# Patient Record
Sex: FEMALE | Race: WHITE | Employment: FULL TIME | URBAN - METROPOLITAN AREA
[De-identification: names, ages, dates, MRNs, and addresses within clinical notes are randomized per-mention and may not be internally consistent; named-entity substitution may affect disease eponyms.]

---

## 2017-09-25 ENCOUNTER — OP HISTORICAL/CONVERTED ENCOUNTER (OUTPATIENT)
Dept: OTHER | Age: 57
End: 2017-09-25

## 2018-10-09 PROBLEM — F41.9 ANXIETY: Status: ACTIVE | Noted: 2018-10-09

## 2018-10-09 RX ORDER — CLORAZEPATE DIPOTASSIUM 3.75 MG/1
TABLET ORAL
COMMUNITY
End: 2018-10-10 | Stop reason: SDUPTHER

## 2018-10-10 ENCOUNTER — OFFICE VISIT (OUTPATIENT)
Dept: FAMILY MEDICINE CLINIC | Age: 58
End: 2018-10-10

## 2018-10-10 VITALS
WEIGHT: 145 LBS | BODY MASS INDEX: 26.68 KG/M2 | OXYGEN SATURATION: 98 % | HEART RATE: 96 BPM | DIASTOLIC BLOOD PRESSURE: 73 MMHG | SYSTOLIC BLOOD PRESSURE: 112 MMHG | TEMPERATURE: 98.5 F | HEIGHT: 62 IN

## 2018-10-10 DIAGNOSIS — F41.9 ANXIETY: Primary | ICD-10-CM

## 2018-10-10 RX ORDER — LEVONORGESTREL AND ETHINYL ESTRADIOL 6-5-10
KIT ORAL
Refills: 3 | COMMUNITY
Start: 2018-08-15 | End: 2020-09-16

## 2018-10-10 RX ORDER — LATANOPROST 50 UG/ML
SOLUTION/ DROPS OPHTHALMIC
Refills: 3 | COMMUNITY
Start: 2018-08-24

## 2018-10-10 RX ORDER — CLORAZEPATE DIPOTASSIUM 3.75 MG/1
3.75 TABLET ORAL 3 TIMES DAILY
Qty: 90 TAB | Refills: 2 | Status: SHIPPED | OUTPATIENT
Start: 2018-10-10 | End: 2019-01-07 | Stop reason: SDUPTHER

## 2018-10-10 NOTE — PROGRESS NOTES
[address] [facility] [address], [address] [facility], [address] [facility] [address]. Marcellus Gilliam. Wil João Phone:  [phone] Fax:  [phone] Progress Note Name:  Jf Kaur Age:  62 y.o. 
:  1960 Encounter Date:  10/10/2018 Primary Care Provider:  Amado Hernandez MD 
 
Chief Complaint Patient presents with  Medication Refill HPI: 
Patient here to follow-up for anxiety and determine medication refill and adjustments and appropriate lab evaluation. Patient is compliant with medications and no new side effects. Medication monitoring:  last checked:2/15/18,  shows appropriate use Last urine drug screen:  Last urine drug screen done 17, Controlled substance contract: yes pain contract signed:   
 
Past Medical History:  
Diagnosis Date  Anxiety Past Surgical History:  
Procedure Laterality Date  HX CHOLECYSTECTOMY Family History Problem Relation Age of Onset  Other Mother   
  brain tumor  Diabetes Maternal Aunt Social History Social History  Marital status: UNKNOWN Spouse name: N/A  
 Number of children: N/A  
 Years of education: N/A Social History Main Topics  Smoking status: Current Every Day Smoker Packs/day: 0.50 Types: Cigarettes  Smokeless tobacco: Never Used  Alcohol use Yes Comment: occassional  
 Drug use: No  
 Sexual activity: Yes Other Topics Concern  None Social History Narrative  None Current Outpatient Prescriptions Medication Sig Dispense Refill  latanoprost (XALATAN) 0.005 % ophthalmic solution INSTILL 1 DROP INTO BOTH EYES QHS  3  
 levonorgestrel-ethinyl estradiol (ENPRESSE) 50-30 (6)/75-40 (5)/125-30(10) tab TK 1 T PO D  3  
 clorazepate (TRANXENE) 3.75 mg tablet Take 1 Tab by mouth three (3) times daily. Max Daily Amount: 11.25 mg. 90 Tab 2  
 levonorgestrel-ethin estradiol (ENPRESSE PO) Take  by mouth. No Known Allergies Patient Active Problem List  
Diagnosis Code  Anxiety F41.9 Review of Systems Constitutional: Negative for fever. Respiratory: Negative for shortness of breath. Cardiovascular: Negative for chest pain, orthopnea and PND. Gastrointestinal: Negative for abdominal pain, nausea and vomiting. Visit Vitals  /73 (BP 1 Location: Right arm, BP Patient Position: Sitting)  Pulse 96  Temp 98.5 °F (36.9 °C) (Oral)  Ht 5' 2\" (1.575 m)  Wt 145 lb (65.8 kg)  LMP 10/03/2018  SpO2 98%  BMI 26.52 kg/m2 Physical Exam  
Constitutional: She is oriented to person, place, and time and well-developed, well-nourished, and in no distress. Cardiovascular: Normal rate, regular rhythm and normal heart sounds. Pulmonary/Chest: Effort normal and breath sounds normal. No respiratory distress. She has no wheezes. Neurological: She is alert and oriented to person, place, and time. Skin: Skin is warm and dry. Labs/Radiology Reviewed Assessment/Plan: ICD-10-CM ICD-9-CM 1. Anxiety F41.9 300.00 clorazepate (TRANXENE) 3.75 mg tablet Orders Placed This Encounter  clorazepate (TRANXENE) 3.75 mg tablet Follow-up Disposition: 
Return in about 4 weeks (around 11/7/2018) for annual exam with lab work. Juanita Steel MD 
10/10/2018

## 2018-10-10 NOTE — PROGRESS NOTES
David Torrez is a 62 y.o. female Chief Complaint Patient presents with  Medication Refill 1. Have you been to the ER, urgent care clinic since your last visit? Hospitalized since your last visit? [facility] 2. Have you seen or consulted any other health care providers outside of the [facility] since your last visit? Include any pap smears or colon screening.    no

## 2018-10-11 DIAGNOSIS — Z00.00 ANNUAL PHYSICAL EXAM: Primary | ICD-10-CM

## 2018-11-08 LAB
ALBUMIN SERPL-MCNC: 4.5 G/DL (ref 3.5–5.5)
ALBUMIN/CREAT UR: 27.3 MG/G CREAT (ref 0–30)
ALP SERPL-CCNC: 58 IU/L (ref 39–117)
ALT SERPL-CCNC: 22 IU/L (ref 0–32)
AST SERPL-CCNC: 19 IU/L (ref 0–40)
BASOPHILS # BLD AUTO: 0 X10E3/UL (ref 0–0.2)
BASOPHILS NFR BLD AUTO: 0 %
BILIRUB DIRECT SERPL-MCNC: 0.13 MG/DL (ref 0–0.4)
BILIRUB SERPL-MCNC: 0.4 MG/DL (ref 0–1.2)
BUN SERPL-MCNC: 11 MG/DL (ref 6–24)
BUN/CREAT SERPL: 14 (ref 9–23)
CALCIUM SERPL-MCNC: 9.1 MG/DL (ref 8.7–10.2)
CHLORIDE SERPL-SCNC: 101 MMOL/L (ref 96–106)
CHOLEST SERPL-MCNC: 148 MG/DL (ref 100–199)
CO2 SERPL-SCNC: 27 MMOL/L (ref 20–29)
CREAT SERPL-MCNC: 0.8 MG/DL (ref 0.57–1)
CREAT UR-MCNC: 82.1 MG/DL
EOSINOPHIL # BLD AUTO: 0.1 X10E3/UL (ref 0–0.4)
EOSINOPHIL NFR BLD AUTO: 1 %
ERYTHROCYTE [DISTWIDTH] IN BLOOD BY AUTOMATED COUNT: 13.1 % (ref 12.3–15.4)
EST. AVERAGE GLUCOSE BLD GHB EST-MCNC: 108 MG/DL
GLUCOSE SERPL-MCNC: 93 MG/DL (ref 65–99)
HBA1C MFR BLD: 5.4 % (ref 4.8–5.6)
HCT VFR BLD AUTO: 39.5 % (ref 34–46.6)
HCV AB S/CO SERPL IA: 0.2 S/CO RATIO (ref 0–0.9)
HDLC SERPL-MCNC: 57 MG/DL
HGB BLD-MCNC: 12.7 G/DL (ref 11.1–15.9)
HIV 1+2 AB+HIV1 P24 AG SERPL QL IA: NON REACTIVE
IMM GRANULOCYTES # BLD: 0 X10E3/UL (ref 0–0.1)
IMM GRANULOCYTES NFR BLD: 0 %
LDLC SERPL CALC-MCNC: 76 MG/DL (ref 0–99)
LYMPHOCYTES # BLD AUTO: 2 X10E3/UL (ref 0.7–3.1)
LYMPHOCYTES NFR BLD AUTO: 35 %
MCH RBC QN AUTO: 32.6 PG (ref 26.6–33)
MCHC RBC AUTO-ENTMCNC: 32.2 G/DL (ref 31.5–35.7)
MCV RBC AUTO: 102 FL (ref 79–97)
MICROALBUMIN UR-MCNC: 22.4 UG/ML
MONOCYTES # BLD AUTO: 0.3 X10E3/UL (ref 0.1–0.9)
MONOCYTES NFR BLD AUTO: 6 %
NEUTROPHILS # BLD AUTO: 3.3 X10E3/UL (ref 1.4–7)
NEUTROPHILS NFR BLD AUTO: 58 %
PLATELET # BLD AUTO: 264 X10E3/UL (ref 150–379)
POTASSIUM SERPL-SCNC: 4.6 MMOL/L (ref 3.5–5.2)
PROT SERPL-MCNC: 7.2 G/DL (ref 6–8.5)
RBC # BLD AUTO: 3.89 X10E6/UL (ref 3.77–5.28)
SODIUM SERPL-SCNC: 141 MMOL/L (ref 134–144)
TRIGL SERPL-MCNC: 76 MG/DL (ref 0–149)
TSH SERPL DL<=0.005 MIU/L-ACNC: 1.09 UIU/ML (ref 0.45–4.5)
VLDLC SERPL CALC-MCNC: 15 MG/DL (ref 5–40)
WBC # BLD AUTO: 5.7 X10E3/UL (ref 3.4–10.8)

## 2018-11-13 ENCOUNTER — OFFICE VISIT (OUTPATIENT)
Dept: FAMILY MEDICINE CLINIC | Age: 58
End: 2018-11-13

## 2018-11-13 VITALS
TEMPERATURE: 97.6 F | HEART RATE: 90 BPM | SYSTOLIC BLOOD PRESSURE: 125 MMHG | WEIGHT: 151 LBS | HEIGHT: 62 IN | DIASTOLIC BLOOD PRESSURE: 75 MMHG | BODY MASS INDEX: 27.79 KG/M2 | OXYGEN SATURATION: 98 % | RESPIRATION RATE: 18 BRPM

## 2018-11-13 DIAGNOSIS — Z23 ENCOUNTER FOR IMMUNIZATION: Primary | ICD-10-CM

## 2018-11-13 DIAGNOSIS — Z00.00 ANNUAL PHYSICAL EXAM: ICD-10-CM

## 2018-11-13 NOTE — PROGRESS NOTES
Sailaja Rodriguez is a 62 y.o. female who had concerns including Complete Physical.    Health maintenance:    Immunizations needed: None documented in past   Tdap or DT maybe   Shingrix needs   Influenza vaccine needs    Cancer screening status   Colonoscopy never willing to do cologard but not colonoscopy   PAP all normal   Mammogram 1/19/2017 Breast bx X 2 benign   Lung CT smoker but not indicated    Bone density screening 1/19/2017 osteopenia    Cardiovascular risk factors:   Smoking yes   HTN no   Cholesterol great   Diabetes nondiabetic   Chronic kidney disease   Family History    Last eye exam: getting  Last dental exam:not getting    Meds:    Current Outpatient Medications:     varicella-zoster recombinant, PF, (SHINGRIX) 50 mcg/0.5 mL susr injection, 0.5 mL by IntraMUSCular route once for 1 dose. Repeat second dose in 6 months., Disp: 0.5 mL, Rfl: 1    latanoprost (XALATAN) 0.005 % ophthalmic solution, INSTILL 1 DROP INTO BOTH EYES QHS, Disp: , Rfl: 3    levonorgestrel-ethinyl estradiol (ENPRESSE) 50-30 (6)/75-40 (5)/125-30(10) tab, TK 1 T PO D, Disp: , Rfl: 3    clorazepate (TRANXENE) 3.75 mg tablet, Take 1 Tab by mouth three (3) times daily.  Max Daily Amount: 11.25 mg., Disp: 90 Tab, Rfl: 2   Allergies:  No Known Allergies  Smoker:   Social History     Tobacco Use   Smoking Status Current Every Day Smoker    Packs/day: 0.35    Types: Cigarettes   Smokeless Tobacco Never Used     ETOH:   Social History     Substance and Sexual Activity   Alcohol Use No    Frequency: Never       FH:    Family History   Problem Relation Age of Onset    Other Mother         brain tumor    Diabetes Maternal Aunt        ROS:  General/Constitutional:  No headache, fever, fatigue, weight loss or weight gain     Eyes:  No redness, pruritis, pain, visual changes, swelling, or discharge    Ears:  No pain, loss or changes in hearin    Neck:  No swelling, masses, stiffness, pain, or limited movemen    Cardiac:   No chest pain Respiratory:  No cough or shortness of breath    GI:  No nausea/vomiting, diarrhea, abdominal pain, bloody or dark stools     :  No dysuria or  hematuria   Neurological:  No loss of consciousness, dizziness, seizures, dysarthria, cognitive changes, memory changes,  problems with balance, or unilateral weakness    Skin: No rash         Physical Exam:  Visit Vitals  /75 (BP 1 Location: Left arm, BP Patient Position: Sitting)   Pulse 90   Temp 97.6 °F (36.4 °C) (Oral)   Resp 18   Ht 5' 2\" (1.575 m)   Wt 151 lb (68.5 kg)   LMP 11/02/2018   SpO2 98%   BMI 27.62 kg/m²       Physical Examination:   General appearance - alert, well appearing, and in no distress, oriented to person, place, and time and normal appearing weight  Mental status -  normal mood, behavior, speech, dress, motor activity, and thought processes, no expressed suicidal or homicidal ideation, no hallucinations  Ears - bilateral TM's and external ear canals normal  Nose - normal and patent, no erythema, discharge or polyps  Mouth - mucous membranes moist, pharynx normal without lesions  Neck - supple, no significant adenopathy  Breast-sees GYN  Chest - normal chest excursion, normal inspiratory and expiratory function. Clear to ausculation bilaterally. Heart - normal rate, regular rhythm, normal S1, S2, no murmurs, rubs, clicks or gallops, no extremity edema  Abdomen- benign, no organomegaly or masses  GYN-sees GYN  Skin-no rashes or suspicious moles  Neuro normal speech, moves all extremities, normal gait  Musculoskeletal- grossly normal joint and motor function. d to person, place, and time and normal appearing weight      Assessment and Plan:    1. Encounter for immunization  Check on status of tet booster  - varicella-zoster recombinant, PF, (SHINGRIX) 50 mcg/0.5 mL susr injection; 0.5 mL by IntraMUSCular route once for 1 dose. Repeat second dose in 6 months. Dispense: 0.5 mL; Refill: 1    2.  Annual physical exam  Get yearly mammogram,  cologard ordered  Stop smoking. Yearly exam  Should talk to GYN about getting off of OCP's      Diagnoses and plans were discussed with the patient. After visit summary given to the patient as well.     Pauline Victor MD

## 2018-11-13 NOTE — PATIENT INSTRUCTIONS
Well Visit, Women 48 to 72: Care Instructions  Your Care Instructions    Physical exams can help you stay healthy. Your doctor has checked your overall health and may have suggested ways to take good care of yourself. He or she also may have recommended tests. At home, you can help prevent illness with healthy eating, regular exercise, and other steps. Follow-up care is a key part of your treatment and safety. Be sure to make and go to all appointments, and call your doctor if you are having problems. It's also a good idea to know your test results and keep a list of the medicines you take. How can you care for yourself at home? · Reach and stay at a healthy weight. This will lower your risk for many problems, such as obesity, diabetes, heart disease, and high blood pressure. · Get at least 30 minutes of exercise on most days of the week. Walking is a good choice. You also may want to do other activities, such as running, swimming, cycling, or playing tennis or team sports. · Do not smoke. Smoking can make health problems worse. If you need help quitting, talk to your doctor about stop-smoking programs and medicines. These can increase your chances of quitting for good. · Protect your skin from too much sun. When you're outdoors from 10 a.m. to 4 p.m., stay in the shade or cover up with clothing and a hat with a wide brim. Wear sunglasses that block UV rays. Even when it's cloudy, put broad-spectrum sunscreen (SPF 30 or higher) on any exposed skin. · See a dentist one or two times a year for checkups and to have your teeth cleaned. · Wear a seat belt in the car. · Limit alcohol to 1 drink a day. Too much alcohol can cause health problems. Follow your doctor's advice about when to have certain tests. These tests can spot problems early. · Cholesterol.  Your doctor will tell you how often to have this done based on your age, family history, or other things that can increase your risk for heart attack and stroke. · Blood pressure. Have your blood pressure checked during a routine doctor visit. Your doctor will tell you how often to check your blood pressure based on your age, your blood pressure results, and other factors. · Mammogram. Ask your doctor how often you should have a mammogram, which is an X-ray of your breasts. A mammogram can spot breast cancer before it can be felt and when it is easiest to treat. · Pap test and pelvic exam. Ask your doctor how often you should have a Pap test. You may not need to have a Pap test as often as you used to. · Vision. Have your eyes checked every year or two or as often as your doctor suggests. Some experts recommend that you have yearly exams for glaucoma and other age-related eye problems starting at age 48. · Hearing. Tell your doctor if you notice any change in your hearing. You can have tests to find out how well you hear. · Diabetes. Ask your doctor whether you should have tests for diabetes. · Colon cancer. You should begin tests for colon cancer at age 48. You may have one of several tests. Your doctor will tell you how often to have tests based on your age and risk. Risks include whether you already had a precancerous polyp removed from your colon or whether your parents, sisters and brothers, or children have had colon cancer. · Thyroid disease. Talk to your doctor about whether to have your thyroid checked as part of a regular physical exam. Women have an increased chance of a thyroid problem. · Osteoporosis. You should begin tests for bone density at age 72. If you are younger than 72, ask your doctor whether you have factors that may increase your risk for this disease. You may want to have this test before age 72. · Heart attack and stroke risk. At least every 4 to 6 years, you should have your risk for heart attack and stroke assessed.  Your doctor uses factors such as your age, blood pressure, cholesterol, and whether you smoke or have diabetes to show what your risk for a heart attack or stroke is over the next 10 years. When should you call for help? Watch closely for changes in your health, and be sure to contact your doctor if you have any problems or symptoms that concern you. Where can you learn more? Go to http://rayna-leno.info/. Enter K602 in the search box to learn more about \"Well Visit, Women 50 to 72: Care Instructions. \"  Current as of: March 29, 2018  Content Version: 11.8  © 8263-5050 [employer]. Care instructions adapted under license by [employer] (which disclaims liability or warranty for this information). If you have questions about a medical condition or this instruction, always ask your healthcare professional. [address] any warranty or liability for your use of this information.     GET MAMMOGRAM    STOP SMOKING    CHECK ON TETANUS BOOSTER STATUS    GET SHINGLES VACCINE    SEE DENTIST    DO COLOGARD

## 2019-01-07 ENCOUNTER — OFFICE VISIT (OUTPATIENT)
Dept: FAMILY MEDICINE CLINIC | Age: 59
End: 2019-01-07

## 2019-01-07 VITALS
DIASTOLIC BLOOD PRESSURE: 62 MMHG | TEMPERATURE: 98.5 F | SYSTOLIC BLOOD PRESSURE: 112 MMHG | HEART RATE: 85 BPM | HEIGHT: 63 IN | WEIGHT: 151 LBS | BODY MASS INDEX: 26.75 KG/M2 | RESPIRATION RATE: 16 BRPM | OXYGEN SATURATION: 99 %

## 2019-01-07 DIAGNOSIS — F41.9 ANXIETY: Primary | ICD-10-CM

## 2019-01-07 DIAGNOSIS — Z79.899 ENCOUNTER FOR DRUG THERAPY: ICD-10-CM

## 2019-01-07 RX ORDER — SULFAMETHOXAZOLE AND TRIMETHOPRIM 800; 160 MG/1; MG/1
TABLET ORAL
COMMUNITY
Start: 2018-10-23 | End: 2019-01-07 | Stop reason: ALTCHOICE

## 2019-01-07 RX ORDER — NITROFURANTOIN 25; 75 MG/1; MG/1
CAPSULE ORAL
Refills: 0 | COMMUNITY
Start: 2018-11-05 | End: 2019-01-07 | Stop reason: ALTCHOICE

## 2019-01-07 RX ORDER — CLORAZEPATE DIPOTASSIUM 3.75 MG/1
3.75 TABLET ORAL 3 TIMES DAILY
Qty: 90 TAB | Refills: 0 | Status: SHIPPED | OUTPATIENT
Start: 2019-03-06 | End: 2019-04-18 | Stop reason: SDUPTHER

## 2019-01-07 RX ORDER — CLORAZEPATE DIPOTASSIUM 3.75 MG/1
3.75 TABLET ORAL 3 TIMES DAILY
Qty: 90 TAB | Refills: 0 | Status: SHIPPED | OUTPATIENT
Start: 2019-02-06 | End: 2019-04-18 | Stop reason: SDUPTHER

## 2019-01-07 RX ORDER — CLORAZEPATE DIPOTASSIUM 3.75 MG/1
3.75 TABLET ORAL 3 TIMES DAILY
Qty: 90 TAB | Refills: 0 | Status: SHIPPED | OUTPATIENT
Start: 2019-01-07 | End: 2019-04-18 | Stop reason: SDUPTHER

## 2019-01-07 RX ORDER — PHENAZOPYRIDINE HYDROCHLORIDE 200 MG/1
TABLET, FILM COATED ORAL
COMMUNITY
Start: 2018-10-23 | End: 2019-01-07 | Stop reason: ALTCHOICE

## 2019-01-07 RX ORDER — CLORAZEPATE DIPOTASSIUM 3.75 MG/1
3.75 TABLET ORAL 3 TIMES DAILY
Qty: 90 TAB | Refills: 2 | Status: SHIPPED | OUTPATIENT
Start: 2019-01-07 | End: 2019-01-07 | Stop reason: CLARIF

## 2019-01-07 NOTE — PROGRESS NOTES
Maliha Gramajo is a 62 y.o. female who had concerns including Medication Refill (clorazepate refill). Patient presents today for Anxiety. Patient has had anxiety for 6-7 years. She currently takes Clorazepate 3.75mg TID. Patient taking medication as prescribed and without complaints. She has been on medication for 6-7 years. Last UDS was 11/21/17. She was previously on 7.5mg BID and has slowly decreased to 3.75mg from QID to TID. Work is very stressful and causes her to anxiety to worsen. ROS: (positive in bold)  General: wt loss, fever, chills, fatigue   Cardiac: chest pain   Pul: SOB  Psych: anxiety, depression    Past Medical History:  Past Medical History:   Diagnosis Date    Anxiety        Past Surgical History:  Past Surgical History:   Procedure Laterality Date    HX BREAST BIOPSY      X2 normal    HX CHOLECYSTECTOMY         Family History:  Family History   Problem Relation Age of Onset    Other Mother         brain tumor    Diabetes Maternal Aunt        Allergies:  No Known Allergies    Social History:  Social History     Tobacco Use    Smoking status: Current Every Day Smoker     Packs/day: 0.35     Types: Cigarettes    Smokeless tobacco: Never Used   Substance Use Topics    Alcohol use: No     Frequency: Never    Drug use: No       Current Meds:  Current Outpatient Medications on File Prior to Visit   Medication Sig Dispense Refill    latanoprost (XALATAN) 0.005 % ophthalmic solution INSTILL 1 DROP INTO BOTH EYES QHS  3    levonorgestrel-ethinyl estradiol (ENPRESSE) 50-30 (6)/75-40 (5)/125-30(10) tab TK 1 T PO D  3     No current facility-administered medications on file prior to visit.          Visit Vitals  /62 (BP 1 Location: Right arm, BP Patient Position: Sitting)   Pulse 85   Temp 98.5 °F (36.9 °C) (Oral)   Resp 16   Ht 5' 2.5\" (1.588 m)   Wt 151 lb (68.5 kg)   LMP 12/25/2018 (Within Days) Comment: regular   SpO2 99%   BMI 27.18 kg/m²       Gen:  Well developed, well nourished female in no acute distress  HEENT: normocephalic/atraumatic; EOMI  Card:  RRR, no m/r/g  Chest:  CTAB, no w/r/r  Abd:  BS+, Soft, nontender/nondistended  Psych:  Nl mood and affect     Assessment/Plan:      ICD-10-CM ICD-9-CM    1. Anxiety F41.9 300.00 10-PANEL URINE DRUG SCREEN      clorazepate (TRANXENE) 3.75 mg tablet      clorazepate (TRANXENE) 3.75 mg tablet      clorazepate (TRANXENE) 3.75 mg tablet      DISCONTINUED: clorazepate (TRANXENE) 3.75 mg tablet   2. Encounter for drug therapy Z79.899 V58.69 10-PANEL URINE DRUG SCREEN      History of Anxiety. Currently on Clorazepate 3.75mg. Dose has been slowly tapered to current dose.  was reviewed and appropriate w/o red flags. Taking medication as prescribed. Had a discussion about further tapering off of mediation. However, patient feels like her anxiety would be significantly worse if that were the case. Will consider tapering to BID at next visit. Will get UDS today. I have discussed the diagnosis with the patient and the intended plan as seen in the above orders. The patient has received an after-visit summary and questions were answered concerning future plans. I have discussed medication side effects and warnings with the patient as well. The patient agrees and understands above plan. Follow-up Disposition:  Return in about 3 months (around 4/7/2019) for Anxiety. Patient discussed with supervising attending.     Kami Mcpherson DO

## 2019-01-07 NOTE — PROGRESS NOTES
Identified pt with two pt identifiers(name and ). Chief Complaint   Patient presents with    Medication Refill     clorazepate refill        Health Maintenance Due   Topic    Pneumococcal 19-64 Medium Risk (1 of 1 - PPSV23)    DTaP/Tdap/Td series (1 - Tdap)    PAP AKA CERVICAL CYTOLOGY     Shingrix Vaccine Age 50> (1 of 2)    FOBT Q 1 YEAR AGE 54-65     Influenza Age 5 to Adult     BREAST CANCER SCRN MAMMOGRAM        Wt Readings from Last 3 Encounters:   19 151 lb (68.5 kg)   18 151 lb (68.5 kg)   10/10/18 145 lb (65.8 kg)     Temp Readings from Last 3 Encounters:   18 97.6 °F (36.4 °C) (Oral)   10/10/18 98.5 °F (36.9 °C) (Oral)     BP Readings from Last 3 Encounters:   18 125/75   10/10/18 112/73     Pulse Readings from Last 3 Encounters:   18 90   10/10/18 96         Learning Assessment:  :     Learning Assessment 10/10/2018   PRIMARY LEARNER Patient   HIGHEST LEVEL OF EDUCATION - PRIMARY LEARNER  GRADUATED HIGH SCHOOL OR GED   PRIMARY LANGUAGE ENGLISH   LEARNER PREFERENCE PRIMARY LISTENING   ANSWERED BY self   RELATIONSHIP SELF       Depression Screening:  :     PHQ over the last two weeks 10/10/2018   Little interest or pleasure in doing things Not at all   Feeling down, depressed, irritable, or hopeless Not at all   Total Score PHQ 2 0       Fall Risk Assessment:  :     No flowsheet data found. Abuse Screening:  :     No flowsheet data found. Coordination of Care Questionnaire:  :     1) Have you been to an emergency room, urgent care clinic since your last visit? no   Hospitalized since your last visit? no             2) Have you seen or consulted any other health care providers outside of [address] [place] [address] since your last visit? yes  Eye doctor - DR Shanda Puentes at [facility],  (Include any pap smears or colon screenings in this section.)    3) Do you have an Advance Directive on file?  no  Are you interested in receiving information about Advance Directives? no    Patient is accompanied by self I have received verbal consent from Mita Gillespie to discuss any/all medical information while they are present in the room. Reviewed record in preparation for visit and have obtained necessary documentation. Medication reconciliation up to date and corrected with patient at this time.

## 2019-01-11 LAB
AMPHETAMINES UR QL SCN: NEGATIVE NG/ML
BARBITURATES UR QL SCN: NEGATIVE NG/ML
BENZODIAZ UR QL: POSITIVE
BZE UR QL: NEGATIVE NG/ML
CANNABINOIDS UR QL SCN: NEGATIVE NG/ML
MDMA UR QL SCN: NEGATIVE NG/ML
METHADONE UR QL SCN: NEGATIVE NG/ML
METHAQUALONE UR QL: NEGATIVE NG/ML
OPIATES UR QL: NEGATIVE NG/ML
PCP UR QL: NEGATIVE NG/ML
PROPOXYPH UR QL SCN: NEGATIVE NG/ML

## 2019-01-18 ENCOUNTER — TELEPHONE (OUTPATIENT)
Dept: FAMILY MEDICINE CLINIC | Age: 59
End: 2019-01-18

## 2019-01-18 NOTE — TELEPHONE ENCOUNTER
Received letter from [facility] stating that patient has not sent their test in. Gave patient a call reminding patient to send in kit. Patient verbalized that she did have the test and will get it done in the next week.

## 2019-04-18 ENCOUNTER — OFFICE VISIT (OUTPATIENT)
Dept: FAMILY MEDICINE CLINIC | Age: 59
End: 2019-04-18

## 2019-04-18 VITALS
OXYGEN SATURATION: 98 % | TEMPERATURE: 98.3 F | DIASTOLIC BLOOD PRESSURE: 57 MMHG | SYSTOLIC BLOOD PRESSURE: 118 MMHG | RESPIRATION RATE: 16 BRPM | HEIGHT: 63 IN | HEART RATE: 70 BPM | WEIGHT: 153 LBS | BODY MASS INDEX: 27.11 KG/M2

## 2019-04-18 DIAGNOSIS — F41.9 ANXIETY: ICD-10-CM

## 2019-04-18 RX ORDER — CLORAZEPATE DIPOTASSIUM 3.75 MG/1
3.75 TABLET ORAL 3 TIMES DAILY
Qty: 90 TAB | Refills: 2 | Status: SHIPPED | OUTPATIENT
Start: 2019-04-18 | End: 2019-07-16 | Stop reason: SDUPTHER

## 2019-04-18 NOTE — PROGRESS NOTES
Assessment and Plan 1. Anxiety Chronic with chronic stressors Encouraged to taper meds if possible  checked and is OK 
FU 3 months 
- clorazepate (TRANXENE) 3.75 mg tablet; Take 1 Tab by mouth three (3) times daily. Max Daily Amount: 11.25 mg. Dispense: 90 Tab; Refill: 2 Follow-up and Dispositions · Return in about 3 months (around 7/18/2019) for Medication follow up. Diagnosis and plan discussed with patient who verbillized understanding. History of present Cruz Kehr is a 62 y.o. female presenting for Medication Refill (Clorazepate) Chronic anxiety On TID benzo Work and home stress listening to relationship of son and  No new side effects or issues with meds  is OK Review of Systems Psychiatric/Behavioral: Negative for depression, hallucinations, memory loss, substance abuse and suicidal ideas. The patient is nervous/anxious. The patient does not have insomnia. Past Medical History:  
Diagnosis Date  Anxiety Past Surgical History:  
Procedure Laterality Date  HX BREAST BIOPSY X2 normal  
 HX CHOLECYSTECTOMY Family History Problem Relation Age of Onset  Other Mother   
     brain tumor  Diabetes Maternal Aunt Social History Socioeconomic History  Marital status: UNKNOWN Spouse name: Not on file  Number of children: Not on file  Years of education: Not on file  Highest education level: Not on file Occupational History  Not on file Social Needs  Financial resource strain: Not on file  Food insecurity:  
  Worry: Not on file Inability: Not on file  Transportation needs:  
  Medical: Not on file Non-medical: Not on file Tobacco Use  Smoking status: Current Every Day Smoker Packs/day: 0.35 Types: Cigarettes  Smokeless tobacco: Never Used Substance and Sexual Activity  Alcohol use: No  
  Frequency: Never  Drug use: No  
 Sexual activity: Yes  
 Lifestyle  Physical activity:  
  Days per week: Not on file Minutes per session: Not on file  Stress: Not on file Relationships  Social connections:  
  Talks on phone: Not on file Gets together: Not on file Attends Judaism service: Not on file Active member of club or organization: Not on file Attends meetings of clubs or organizations: Not on file Relationship status: Not on file  Intimate partner violence:  
  Fear of current or ex partner: Not on file Emotionally abused: Not on file Physically abused: Not on file Forced sexual activity: Not on file Other Topics Concern  Not on file Social History Narrative  Not on file Current Outpatient Medications Medication Sig Dispense Refill  clorazepate (TRANXENE) 3.75 mg tablet Take 1 Tab by mouth three (3) times daily. Max Daily Amount: 11.25 mg. 90 Tab 2  
 latanoprost (XALATAN) 0.005 % ophthalmic solution INSTILL 1 DROP INTO BOTH EYES QHS  3  
 levonorgestrel-ethinyl estradiol (ENPRESSE) 50-30 (6)/75-40 (5)/125-30(10) tab TK 1 T PO D  3 No Known Allergies Vitals:  
 04/18/19 6932 BP: 118/57 Pulse: 70 Resp: 16 Temp: 98.3 °F (36.8 °C) SpO2: 98% Weight: 153 lb (69.4 kg) Height: 5' 2.5\" (1.588 m) Body mass index is 27.54 kg/m². Objective GENERAL: well developed; well nourished; well groomed; no apparent distress PSYCHIATRIC: Orientation: alert and oriented x 3; ; Mood/Affect: normal mood and affect;  Speech Pattern: normal;  Thought Processes: normal rate and content of thoughts; good abstract reasoning;  Associations: intact thought associations; Abnormal Thoughts: no hallucinations, delusions, obsessions, or suicidal/homicidal ideation;  Insight/Judgment: good insight; good judgment;

## 2019-07-16 ENCOUNTER — OFFICE VISIT (OUTPATIENT)
Dept: FAMILY MEDICINE CLINIC | Age: 59
End: 2019-07-16

## 2019-07-16 VITALS
HEIGHT: 63 IN | DIASTOLIC BLOOD PRESSURE: 70 MMHG | OXYGEN SATURATION: 96 % | RESPIRATION RATE: 16 BRPM | BODY MASS INDEX: 26.93 KG/M2 | HEART RATE: 87 BPM | SYSTOLIC BLOOD PRESSURE: 114 MMHG | TEMPERATURE: 98.6 F | WEIGHT: 152 LBS

## 2019-07-16 DIAGNOSIS — F41.9 ANXIETY: ICD-10-CM

## 2019-07-16 RX ORDER — CLORAZEPATE DIPOTASSIUM 3.75 MG/1
3.75 TABLET ORAL 3 TIMES DAILY
Qty: 90 TAB | Refills: 2 | Status: SHIPPED | OUTPATIENT
Start: 2019-07-16 | End: 2019-10-14 | Stop reason: SDUPTHER

## 2019-07-16 NOTE — PROGRESS NOTES
Assessment and Plan    1. Anxiety  Stable on current benzo. No evidence of misuse or abuse. - clorazepate (TRANXENE) 3.75 mg tablet; Take 1 Tab by mouth three (3) times daily. Max Daily Amount: 11.25 mg. Dispense: 90 Tab; Refill: 2      Follow-up and Dispositions    · Return in about 3 months (around 10/16/2019) for Medication follow up. Diagnosis and plan discussed with patient who verbillized understanding. History of present Guille Mccann is a 62 y.o. female presenting for Medication Refill    Here for refill of tranxene  Still ongoing stressors at home  Son getting ready to go to [employer] at [employer]. Issues at home over money. Review of Systems   Respiratory: Negative. Cardiovascular: Negative. Psychiatric/Behavioral: Negative for depression and suicidal ideas. The patient is nervous/anxious.           Past Medical History:   Diagnosis Date    Anxiety      Past Surgical History:   Procedure Laterality Date    HX BREAST BIOPSY      X2 normal    HX CHOLECYSTECTOMY       Family History   Problem Relation Age of Onset    Other Mother         brain tumor    Diabetes Maternal Aunt      Social History     Socioeconomic History    Marital status: UNKNOWN     Spouse name: Not on file    Number of children: Not on file    Years of education: Not on file    Highest education level: Not on file   Occupational History    Not on file   Social Needs    Financial resource strain: Not on file    Food insecurity:     Worry: Not on file     Inability: Not on file    Transportation needs:     Medical: Not on file     Non-medical: Not on file   Tobacco Use    Smoking status: Current Every Day Smoker     Packs/day: 0.35     Types: Cigarettes    Smokeless tobacco: Never Used   Substance and Sexual Activity    Alcohol use: No     Frequency: Never    Drug use: No    Sexual activity: Yes   Lifestyle    Physical activity:     Days per week: Not on file     Minutes per session: Not on file    Stress: Not on file   Relationships    Social connections:     Talks on phone: Not on file     Gets together: Not on file     Attends Buddhist service: Not on file     Active member of club or organization: Not on file     Attends meetings of clubs or organizations: Not on file     Relationship status: Not on file    Intimate partner violence:     Fear of current or ex partner: Not on file     Emotionally abused: Not on file     Physically abused: Not on file     Forced sexual activity: Not on file   Other Topics Concern    Not on file   Social History Narrative    Not on file         Current Outpatient Medications   Medication Sig Dispense Refill    clorazepate (TRANXENE) 3.75 mg tablet Take 1 Tab by mouth three (3) times daily. Max Daily Amount: 11.25 mg. 90 Tab 2    latanoprost (XALATAN) 0.005 % ophthalmic solution INSTILL 1 DROP INTO BOTH EYES QHS  3    levonorgestrel-ethinyl estradiol (ENPRESSE) 50-30 (6)/75-40 (5)/125-30(10) tab TK 1 T PO D  3         No Known Allergies    Vitals:    07/16/19 1545   BP: 114/70   Pulse: 87   Resp: 16   Temp: 98.6 °F (37 °C)   TempSrc: Oral   SpO2: 96%   Weight: 152 lb (68.9 kg)   Height: 5' 2.5\" (1.588 m)     Body mass index is 27.36 kg/m². Objective    GENERAL: well developed; well nourished; well groomed; no apparent distress  PSYCHIATRIC: Orientation: alert and oriented x 3; ; Mood/Affect: normal mood and affect;  Speech Pattern: normal;  Thought Processes: normal rate and content of thoughts; good abstract reasoning;  Associations: intact thought associations; Abnormal Thoughts: no hallucinations, delusions, obsessions, or suicidal/homicidal ideation;  Insight/Judgment: good insight; good judgment;

## 2019-09-23 PROBLEM — Z00.00 ANNUAL PHYSICAL EXAM: Status: RESOLVED | Noted: 2018-11-13 | Resolved: 2019-09-23

## 2019-10-14 ENCOUNTER — OFFICE VISIT (OUTPATIENT)
Dept: FAMILY MEDICINE CLINIC | Age: 59
End: 2019-10-14

## 2019-10-14 VITALS
DIASTOLIC BLOOD PRESSURE: 72 MMHG | HEART RATE: 82 BPM | WEIGHT: 150 LBS | TEMPERATURE: 98.2 F | SYSTOLIC BLOOD PRESSURE: 118 MMHG | RESPIRATION RATE: 18 BRPM | HEIGHT: 63 IN | BODY MASS INDEX: 26.58 KG/M2 | OXYGEN SATURATION: 97 %

## 2019-10-14 DIAGNOSIS — F41.9 ANXIETY: ICD-10-CM

## 2019-10-14 DIAGNOSIS — R30.0 BURNING WITH URINATION: Primary | ICD-10-CM

## 2019-10-14 LAB
BILIRUB UR QL STRIP: NEGATIVE
GLUCOSE UR-MCNC: NEGATIVE MG/DL
KETONES P FAST UR STRIP-MCNC: NEGATIVE MG/DL
PH UR STRIP: 7.5 [PH] (ref 4.6–8)
PROT UR QL STRIP: ABNORMAL
SP GR UR STRIP: 1.02 (ref 1–1.03)
UA UROBILINOGEN AMB POC: ABNORMAL (ref 0.2–1)
URINALYSIS CLARITY POC: ABNORMAL
URINALYSIS COLOR POC: YELLOW
URINE BLOOD POC: ABNORMAL
URINE LEUKOCYTES POC: ABNORMAL
URINE NITRITES POC: POSITIVE

## 2019-10-14 RX ORDER — SULFAMETHOXAZOLE AND TRIMETHOPRIM 800; 160 MG/1; MG/1
1 TABLET ORAL 2 TIMES DAILY
Qty: 20 TAB | Refills: 0 | Status: SHIPPED | OUTPATIENT
Start: 2019-10-14 | End: 2019-10-14 | Stop reason: SDUPTHER

## 2019-10-14 RX ORDER — SULFAMETHOXAZOLE AND TRIMETHOPRIM 800; 160 MG/1; MG/1
1 TABLET ORAL 2 TIMES DAILY
Qty: 20 TAB | Refills: 0 | Status: SHIPPED | OUTPATIENT
Start: 2019-10-14 | End: 2019-10-24

## 2019-10-14 RX ORDER — CLORAZEPATE DIPOTASSIUM 3.75 MG/1
3.75 TABLET ORAL 3 TIMES DAILY
Qty: 90 TAB | Refills: 2 | Status: SHIPPED | OUTPATIENT
Start: 2019-10-14 | End: 2020-01-08 | Stop reason: SDUPTHER

## 2019-10-14 NOTE — PROGRESS NOTES
Assessment and Plan    1. Burning with urination  UTI  - AMB POC URINALYSIS DIP STICK AUTO W/O MICRO  - URINALYSIS W/ RFLX MICROSCOPIC  - CULTURE, URINE  - trimethoprim-sulfamethoxazole (BACTRIM DS, SEPTRA DS) 160-800 mg per tablet; Take 1 Tab by mouth two (2) times a day for 10 days. Dispense: 20 Tab; Refill: 0    2. Anxiety  On chronic benzo for anxiety and remains stable  - clorazepate (TRANXENE) 3.75 mg tablet; Take 1 Tab by mouth three (3) times daily. Max Daily Amount: 11.25 mg. Dispense: 90 Tab; Refill: 2      Follow-up and Dispositions    · Return in about 3 months (around 1/14/2020) for Medication follow up. Diagnosis and plan discussed with patient who verbillized understanding. History of present Kirit Harman is a 62 y.o. female presenting for Medication Refill and Urinary Burning    UTI  Dysuria  Hurts at night  Subsides during the day  No fever or chills  No antibx allergies  Last infection in 2018    Anxiety  Remains on chronic Clorazepate  Some stressors that are worse at work      Review of Systems   Constitutional: Negative for chills and fever. Genitourinary: Negative for flank pain. Psychiatric/Behavioral: The patient is nervous/anxious.           Past Medical History:   Diagnosis Date    Anxiety      Past Surgical History:   Procedure Laterality Date    HX BREAST BIOPSY      X2 normal    HX CHOLECYSTECTOMY       Family History   Problem Relation Age of Onset    Other Mother         brain tumor    Diabetes Maternal Aunt      Social History     Socioeconomic History    Marital status:      Spouse name: Not on file    Number of children: Not on file    Years of education: Not on file    Highest education level: Not on file   Occupational History    Not on file   Social Needs    Financial resource strain: Not on file    Food insecurity:     Worry: Not on file     Inability: Not on file    Transportation needs:     Medical: Not on file     Non-medical: Not on file   Tobacco Use    Smoking status: Current Every Day Smoker     Packs/day: 0.35     Types: Cigarettes    Smokeless tobacco: Never Used   Substance and Sexual Activity    Alcohol use: No     Frequency: Never    Drug use: No    Sexual activity: Yes   Lifestyle    Physical activity:     Days per week: Not on file     Minutes per session: Not on file    Stress: Not on file   Relationships    Social connections:     Talks on phone: Not on file     Gets together: Not on file     Attends Yarsanism service: Not on file     Active member of club or organization: Not on file     Attends meetings of clubs or organizations: Not on file     Relationship status: Not on file    Intimate partner violence:     Fear of current or ex partner: Not on file     Emotionally abused: Not on file     Physically abused: Not on file     Forced sexual activity: Not on file   Other Topics Concern    Not on file   Social History Narrative    Not on file         Current Outpatient Medications   Medication Sig Dispense Refill    clorazepate (TRANXENE) 3.75 mg tablet Take 1 Tab by mouth three (3) times daily. Max Daily Amount: 11.25 mg. 90 Tab 2    trimethoprim-sulfamethoxazole (BACTRIM DS, SEPTRA DS) 160-800 mg per tablet Take 1 Tab by mouth two (2) times a day for 10 days. 20 Tab 0    latanoprost (XALATAN) 0.005 % ophthalmic solution INSTILL 1 DROP INTO BOTH EYES QHS  3    levonorgestrel-ethinyl estradiol (ENPRESSE) 50-30 (6)/75-40 (5)/125-30(10) tab TK 1 T PO D  3         No Known Allergies    Vitals:    10/14/19 1537   BP: 118/72   Pulse: 82   Resp: 18   Temp: 98.2 °F (36.8 °C)   TempSrc: Oral   SpO2: 97%   Weight: 150 lb (68 kg)   Height: 5' 2.5\" (1.588 m)     Body mass index is 27 kg/m². Objective  Physical Exam  General: Patient alert and oriented and in NAD  Neck: No thyromegaly or cervical lymphadenopathy  Cardiovascular: Heart has regular rate and rhythm, No murmurs, rubs or gallops.  No edema  Respiratory: Lungs are clear to auscultation bilaterally, no wheezing, rales or rhonchi, normal chest excursion and no increased work of breathing. Gastorintestinal: abdomen is non-tender, non-distended, without organomegaly or masses  Neuro: AAOx3, normal gait and speech. No gross neurologic deficits. Psych: Appropriate mood and affect, no homicidal or suicidal ideation, no obsessions, delusions or hallucinations, normal psychomotor status.

## 2019-10-15 LAB
APPEARANCE UR: ABNORMAL
BACTERIA #/AREA URNS HPF: ABNORMAL /[HPF]
BILIRUB UR QL STRIP: NEGATIVE
CASTS URNS QL MICRO: ABNORMAL /LPF
COLOR UR: YELLOW
EPI CELLS #/AREA URNS HPF: ABNORMAL /HPF (ref 0–10)
GLUCOSE UR QL: NEGATIVE
HGB UR QL STRIP: ABNORMAL
KETONES UR QL STRIP: NEGATIVE
LEUKOCYTE ESTERASE UR QL STRIP: ABNORMAL
MICRO URNS: ABNORMAL
MUCOUS THREADS URNS QL MICRO: PRESENT
NITRITE UR QL STRIP: POSITIVE
PH UR STRIP: 7.5 [PH] (ref 5–7.5)
PROT UR QL STRIP: ABNORMAL
RBC #/AREA URNS HPF: ABNORMAL /HPF (ref 0–2)
SP GR UR: 1.02 (ref 1–1.03)
UROBILINOGEN UR STRIP-MCNC: 1 MG/DL (ref 0.2–1)
WBC #/AREA URNS HPF: ABNORMAL /HPF (ref 0–5)

## 2019-10-16 LAB — BACTERIA UR CULT: ABNORMAL

## 2019-10-16 NOTE — PROGRESS NOTES
Call her. Urine culture shows she has a UTI and the antibiotic we gave her is the correct one and should work. See us to recheck urine in 2 weeks.   [facility]

## 2019-10-17 ENCOUNTER — TELEPHONE (OUTPATIENT)
Dept: FAMILY MEDICINE CLINIC | Age: 59
End: 2019-10-17

## 2019-10-17 NOTE — TELEPHONE ENCOUNTER
----- Message from Tanesha Sellers MD sent at 10/16/2019  5:25 PM EDT -----  Call her. Urine culture shows she has a UTI and the antibiotic we gave her is the correct one and should work. See us to recheck urine in 2 weeks.   [facility]

## 2019-10-28 ENCOUNTER — OFFICE VISIT (OUTPATIENT)
Dept: FAMILY MEDICINE CLINIC | Age: 59
End: 2019-10-28

## 2019-10-28 VITALS
TEMPERATURE: 98.5 F | SYSTOLIC BLOOD PRESSURE: 110 MMHG | BODY MASS INDEX: 26.93 KG/M2 | OXYGEN SATURATION: 100 % | HEART RATE: 87 BPM | HEIGHT: 63 IN | DIASTOLIC BLOOD PRESSURE: 72 MMHG | WEIGHT: 152 LBS | RESPIRATION RATE: 16 BRPM

## 2019-10-28 DIAGNOSIS — R30.0 BURNING WITH URINATION: Primary | ICD-10-CM

## 2019-10-28 LAB
BILIRUB UR QL STRIP: NEGATIVE
GLUCOSE UR-MCNC: NEGATIVE MG/DL
KETONES P FAST UR STRIP-MCNC: NEGATIVE MG/DL
PH UR STRIP: 7 [PH] (ref 4.6–8)
PROT UR QL STRIP: ABNORMAL
SP GR UR STRIP: 1.02 (ref 1–1.03)
UA UROBILINOGEN AMB POC: ABNORMAL (ref 0.2–1)
URINALYSIS CLARITY POC: CLEAR
URINALYSIS COLOR POC: YELLOW
URINE BLOOD POC: ABNORMAL
URINE LEUKOCYTES POC: ABNORMAL
URINE NITRITES POC: NEGATIVE

## 2019-10-28 NOTE — PROGRESS NOTES
Stephany Pabon is a 62 y.o. female      Chief Complaint   Patient presents with    UTI     This is a follow-up visit          1. Have you been to the ER, urgent care clinic since your last visit? Hospitalized since your last visit? No      2. Have you seen or consulted any other health care providers outside of the [address] [place] [address] since your last visit? Include any pap smears or colon screening.   No

## 2019-10-28 NOTE — PROGRESS NOTES
Assessment and Plan    1. Burning with urination  Recheck to see if urine has normalized  If still having hematuria, will send to UROLOGY  - AMB POC URINE DIP STICK MANUAL W/O MICRO CHEMSTRIP-10      Follow-up and Dispositions    · Return in about 3 months (around 1/28/2020) for Medication follow up. Diagnosis and plan discussed with patient who verbillized understanding. History of present Bhavesh Orta is a 62 y.o. female presenting for UTI (This is a follow-up visit )    FU of UTI  Was on  antibx for 10 days  Burning was better immediately  Pressure took longer to resolve, nearly til end of antibx. Review of Systems   Constitutional: Negative for chills and fever. Gastrointestinal: Negative. Genitourinary: Negative.           Past Medical History:   Diagnosis Date    Anxiety      Past Surgical History:   Procedure Laterality Date    HX BREAST BIOPSY      X2 normal    HX CHOLECYSTECTOMY       Family History   Problem Relation Age of Onset    Other Mother         brain tumor    Diabetes Maternal Aunt      Social History     Socioeconomic History    Marital status:      Spouse name: Not on file    Number of children: Not on file    Years of education: Not on file    Highest education level: Not on file   Occupational History    Not on file   Social Needs    Financial resource strain: Not on file    Food insecurity:     Worry: Not on file     Inability: Not on file    Transportation needs:     Medical: Not on file     Non-medical: Not on file   Tobacco Use    Smoking status: Current Every Day Smoker     Packs/day: 0.35     Types: Cigarettes    Smokeless tobacco: Never Used   Substance and Sexual Activity    Alcohol use: No     Frequency: Never    Drug use: No    Sexual activity: Yes   Lifestyle    Physical activity:     Days per week: Not on file     Minutes per session: Not on file    Stress: Not on file   Relationships    Social connections:     Talks on phone: Not on file     Gets together: Not on file     Attends Judaism service: Not on file     Active member of club or organization: Not on file     Attends meetings of clubs or organizations: Not on file     Relationship status: Not on file    Intimate partner violence:     Fear of current or ex partner: Not on file     Emotionally abused: Not on file     Physically abused: Not on file     Forced sexual activity: Not on file   Other Topics Concern    Not on file   Social History Narrative    Not on file         Current Outpatient Medications   Medication Sig Dispense Refill    clorazepate (TRANXENE) 3.75 mg tablet Take 1 Tab by mouth three (3) times daily. Max Daily Amount: 11.25 mg. 90 Tab 2    latanoprost (XALATAN) 0.005 % ophthalmic solution INSTILL 1 DROP INTO BOTH EYES QHS  3    levonorgestrel-ethinyl estradiol (ENPRESSE) 50-30 (6)/75-40 (5)/125-30(10) tab TK 1 T PO D  3         No Known Allergies    Vitals:    10/28/19 1430   BP: 110/72   Pulse: 87   Resp: 16   Temp: 98.5 °F (36.9 °C)   TempSrc: Oral   SpO2: 100%   Weight: 152 lb (68.9 kg)   Height: 5' 2.5\" (1.588 m)     Body mass index is 27.36 kg/m². Objective  Physical Exam   Constitutional: She appears well-developed and well-nourished. No distress. Abdominal: Soft. She exhibits no distension and no mass. There is no hepatosplenomegaly. There is no tenderness. There is no rebound, no guarding and no CVA tenderness. Skin: She is not diaphoretic. Psychiatric: She has a normal mood and affect.  Her behavior is normal. Judgment and thought content normal.

## 2019-10-30 ENCOUNTER — TELEPHONE (OUTPATIENT)
Dept: FAMILY MEDICINE CLINIC | Age: 59
End: 2019-10-30

## 2019-10-30 LAB
APPEARANCE UR: CLEAR
BACTERIA #/AREA URNS HPF: ABNORMAL /[HPF]
BACTERIA UR CULT: NO GROWTH
BILIRUB UR QL STRIP: NEGATIVE
CASTS URNS QL MICRO: ABNORMAL /LPF
COLOR UR: YELLOW
EPI CELLS #/AREA URNS HPF: >10 /HPF (ref 0–10)
GLUCOSE UR QL: NEGATIVE
HGB UR QL STRIP: ABNORMAL
KETONES UR QL STRIP: NEGATIVE
LEUKOCYTE ESTERASE UR QL STRIP: NEGATIVE
MICRO URNS: ABNORMAL
MUCOUS THREADS URNS QL MICRO: PRESENT
NITRITE UR QL STRIP: NEGATIVE
PH UR STRIP: 7.5 [PH] (ref 5–7.5)
PROT UR QL STRIP: ABNORMAL
RBC #/AREA URNS HPF: ABNORMAL /HPF (ref 0–2)
SP GR UR: 1.02 (ref 1–1.03)
UROBILINOGEN UR STRIP-MCNC: 0.2 MG/DL (ref 0.2–1)
WBC #/AREA URNS HPF: ABNORMAL /HPF (ref 0–5)

## 2019-10-30 NOTE — TELEPHONE ENCOUNTER
----- Message from Roscoe Rivas MD sent at 10/30/2019  8:35 AM EDT -----  Call patient. The repeat urine test is fine and shows NO abnormal blood. No need to go to urology. See me as needed.   [facility]

## 2019-10-30 NOTE — PROGRESS NOTES
Call patient. The repeat urine test is fine and shows NO abnormal blood. No need to go to urology. See me as needed.   [facility]

## 2020-01-08 ENCOUNTER — OFFICE VISIT (OUTPATIENT)
Dept: FAMILY MEDICINE CLINIC | Age: 60
End: 2020-01-08

## 2020-01-08 VITALS
SYSTOLIC BLOOD PRESSURE: 112 MMHG | RESPIRATION RATE: 16 BRPM | HEIGHT: 63 IN | WEIGHT: 152 LBS | TEMPERATURE: 98.4 F | HEART RATE: 83 BPM | DIASTOLIC BLOOD PRESSURE: 64 MMHG | OXYGEN SATURATION: 97 % | BODY MASS INDEX: 26.93 KG/M2

## 2020-01-08 DIAGNOSIS — F41.9 ANXIETY: Primary | ICD-10-CM

## 2020-01-08 DIAGNOSIS — Z23 ENCOUNTER FOR IMMUNIZATION: ICD-10-CM

## 2020-01-08 RX ORDER — CLORAZEPATE DIPOTASSIUM 3.75 MG/1
3.75 TABLET ORAL 3 TIMES DAILY
Qty: 90 TAB | Refills: 2 | Status: SHIPPED | OUTPATIENT
Start: 2020-01-08 | End: 2020-04-06 | Stop reason: SDUPTHER

## 2020-01-08 NOTE — PROGRESS NOTES
1. Have you been to the ER, urgent care clinic since your last visit? Hospitalized since your last visit? No    2. Have you seen or consulted any other health care providers outside of the [address] [place] [address] since your last visit? Include any pap smears or colon screening.  N0

## 2020-01-08 NOTE — PROGRESS NOTES
Assessment and Plan    1. Anxiety  Stable on current benzo. No evidence of misuse or abuse. - clorazepate (TRANXENE) 3.75 mg tablet; Take 1 Tab by mouth three (3) times daily. Max Daily Amount: 11.25 mg. Dispense: 90 Tab; Refill: 2    FU 3 months. RX for shingrix and TDAP given. Diagnosis and plan discussed with patient who verbillized understanding. History of present Cruz Kehr is a 61 y.o. female presenting for Medication Refill    Here for refill of tranxene  Still ongoing stressors at home  Son and  still argue which bothers her. Issues at home over money. Review of Systems   Respiratory: Negative. Cardiovascular: Negative. Psychiatric/Behavioral: Negative for depression and suicidal ideas. The patient is nervous/anxious.           Past Medical History:   Diagnosis Date    Anxiety      Past Surgical History:   Procedure Laterality Date    HX BREAST BIOPSY      X2 normal    HX CHOLECYSTECTOMY       Family History   Problem Relation Age of Onset    Other Mother         brain tumor    Diabetes Maternal Aunt      Social History     Socioeconomic History    Marital status:      Spouse name: Not on file    Number of children: Not on file    Years of education: Not on file    Highest education level: Not on file   Occupational History    Not on file   Social Needs    Financial resource strain: Not on file    Food insecurity:     Worry: Not on file     Inability: Not on file    Transportation needs:     Medical: Not on file     Non-medical: Not on file   Tobacco Use    Smoking status: Current Every Day Smoker     Packs/day: 0.35     Types: Cigarettes    Smokeless tobacco: Never Used   Substance and Sexual Activity    Alcohol use: No     Frequency: Never    Drug use: No    Sexual activity: Yes   Lifestyle    Physical activity:     Days per week: Not on file     Minutes per session: Not on file    Stress: Not on file   Relationships    Social connections:     Talks on phone: Not on file     Gets together: Not on file     Attends Taoist service: Not on file     Active member of club or organization: Not on file     Attends meetings of clubs or organizations: Not on file     Relationship status: Not on file    Intimate partner violence:     Fear of current or ex partner: Not on file     Emotionally abused: Not on file     Physically abused: Not on file     Forced sexual activity: Not on file   Other Topics Concern    Not on file   Social History Narrative    Not on file         Current Outpatient Medications   Medication Sig Dispense Refill    clorazepate (TRANXENE) 3.75 mg tablet Take 1 Tab by mouth three (3) times daily. Max Daily Amount: 11.25 mg. 90 Tab 2    diph,Pertuss,Acell,,Tet Vac-PF (ADACEL) 2 Lf-(2.5-5-3-5 mcg)-5Lf/0.5 mL susp 0.5 mL by IntraMUSCular route once for 1 dose. 1 Syringe 0    varicella-zoster recombinant, PF, (SHINGRIX) 50 mcg/0.5 mL susr injection 0.5 mL by IntraMUSCular route once for 1 dose. Repeat second dose in 6 months. 0.5 mL 1    latanoprost (XALATAN) 0.005 % ophthalmic solution INSTILL 1 DROP INTO BOTH EYES QHS  3    levonorgestrel-ethinyl estradiol (ENPRESSE) 50-30 (6)/75-40 (5)/125-30(10) tab TK 1 T PO D  3         No Known Allergies    Vitals:    01/08/20 1514   BP: 112/64   Pulse: 83   Resp: 16   Temp: 98.4 °F (36.9 °C)   TempSrc: Oral   SpO2: 97%   Weight: 152 lb (68.9 kg)   Height: 5' 2.5\" (1.588 m)     Body mass index is 27.36 kg/m². Objective    GENERAL: well developed; well nourished; well groomed; no apparent distress  PSYCHIATRIC: Orientation: alert and oriented x 3; ; Mood/Affect: normal mood and affect;  Speech Pattern: normal;  Thought Processes: normal rate and content of thoughts; good abstract reasoning;  Associations: intact thought associations; Abnormal Thoughts: no hallucinations, delusions, obsessions, or suicidal/homicidal ideation;  Insight/Judgment: good insight; good judgment;

## 2020-04-06 ENCOUNTER — VIRTUAL VISIT (OUTPATIENT)
Dept: FAMILY MEDICINE CLINIC | Age: 60
End: 2020-04-06

## 2020-04-06 VITALS — WEIGHT: 152 LBS | HEIGHT: 62 IN | BODY MASS INDEX: 27.97 KG/M2

## 2020-04-06 DIAGNOSIS — F41.9 ANXIETY: ICD-10-CM

## 2020-04-06 RX ORDER — CLORAZEPATE DIPOTASSIUM 3.75 MG/1
3.75 TABLET ORAL 3 TIMES DAILY
Qty: 90 TAB | Refills: 2 | Status: SHIPPED | OUTPATIENT
Start: 2020-04-06 | End: 2020-07-13 | Stop reason: SDUPTHER

## 2020-04-06 NOTE — PROGRESS NOTES
Moses Davalos is a 61 y.o. female evaluated via telephone on 4/6/2020. Consent:  She and/or health care decision maker is aware that that she may receive a bill for this telephone service, depending on her insurance coverage, and has provided verbal consent to proceed: Yes      Documentation:  I communicated with the patient and/or health care decision maker about anxiety and constipation. Details of this discussion including any medical advice provided:     Constipation. Only the past week  No blood in stool, N&V, or abdo pain. No fever or chills. Usually has BM most mornings. Now is not and has only had 2 BM\"s past week. May be due for colonoscopy. Years ago but would be elective    Anxiety  Stable  Working and exposed to public but not a lot. I have advised not to do elective blood work but try diet changes such as:  Coffee and warm apple juice in AM.    FU if GI sx persist.    1. Anxiety  Anxiety remains stable despite COVID situation. FU if problems and in 3 months  - clorazepate (TRANXENE) 3.75 mg tablet; Take 1 Tab by mouth three (3) times daily. Max Daily Amount: 11.25 mg. Dispense: 90 Tab; Refill: 2      I affirm this is a Patient Initiated Episode with an Established Patient who has not had a related appointment within my department in the past 7 days or scheduled within the next 24 hours. Patient at work and I am in office.     Total Time: minutes: 11-20 minutes    Note: not billable if this call serves to triage the patient into an appointment for the relevant concern      Ramez Fan MD

## 2020-04-06 NOTE — PROGRESS NOTES
Identified pt with two pt identifiers(name and ). Reviewed record in preparation for visit and have obtained necessary documentation. Chief Complaint   Patient presents with    Medication Refill        Health Maintenance Due   Topic    Pneumococcal 0-64 years (1 of 1 - PPSV23)    DTaP/Tdap/Td series (1 - Tdap)    PAP AKA CERVICAL CYTOLOGY     Shingrix Vaccine Age 50> (1 of 2)    FOBT Q1Y Age 54-65     Breast Cancer Screen Mammogram        Visit Vitals  Height 5' 2\" (1.575 m)   Weight 152 lb (68.9 kg)   Body Mass Index 27.80 kg/m²         Coordination of Care Questionnaire:  :   1) Have you been to an emergency room, urgent care, or hospitalized since your last visit? NO      2. Have seen or consulted any other health care provider since your last visit? NO          Patient is accompanied by SELF I have received verbal consent from Emperatriz Lazo to discuss any/all medical information while they are present in the room.

## 2020-07-13 ENCOUNTER — VIRTUAL VISIT (OUTPATIENT)
Dept: FAMILY MEDICINE CLINIC | Age: 60
End: 2020-07-13

## 2020-07-13 DIAGNOSIS — F41.9 ANXIETY: ICD-10-CM

## 2020-07-13 RX ORDER — CLORAZEPATE DIPOTASSIUM 3.75 MG/1
3.75 TABLET ORAL 3 TIMES DAILY
Qty: 90 TAB | Refills: 2 | Status: SHIPPED | OUTPATIENT
Start: 2020-07-13 | End: 2020-10-06 | Stop reason: SDUPTHER

## 2020-07-13 NOTE — PROGRESS NOTES
Felicia Almanza is a 61 y.o. female evaluated via telephone on 7/13/2020. Consent:  She and/or health care decision maker is aware that she may receive a bill for this telephone service, depending on her insurance coverage, and has provided verbal consent to proceed: Yes      Documentation:  I communicated with the patient and/or health care decision maker about anxiety. Details of this discussion including any medical advice provided:     Doing well in spite of COVID epidemic  Working daily. No one sick in family. No change in emotional status  Due for labs for drug screen will do next time in clinic in person.  OK no signs of misuse or abuse. No other scheduled meds. Assessment and Plan:    1. Anxiety  Continue med  FU 3 months. - clorazepate (TRANXENE) 3.75 mg tablet; Take 1 Tab by mouth three (3) times daily. Max Daily Amount: 11.25 mg. Dispense: 90 Tab; Refill: 2      Follow-up and Dispositions    · Return in about 3 months (around 10/13/2020) for Medication follow up. I affirm this is a Patient Initiated Episode with a Patient who has not had a related appointment within my department in the past 7 days or scheduled within the next 24 hours. Total Time: minutes: 11-20 minutes    Note: not billable if this call serves to triage the patient into an appointment for the relevant concern    The patient was at home and I was in office for this phone visit.     Rhys Kelsey MD

## 2020-07-13 NOTE — PROGRESS NOTES
Identified pt with two pt identifiers(name and ). Reviewed record in preparation for visit and have obtained necessary documentation. Chief Complaint   Patient presents with    Medication Refill        Health Maintenance Due   Topic    Pneumococcal 0-64 years (1 of 1 - PPSV23)    DTaP/Tdap/Td series (1 - Tdap)    PAP AKA CERVICAL CYTOLOGY     Shingrix Vaccine Age 50> (1 of 2)    FOBT Q1Y Age 54-65     Breast Cancer Screen Mammogram        Coordination of Care Questionnaire:  :   1) Have you been to an emergency room, urgent care, or hospitalized since your last visit? If yes, where when, and reason for visit? Yes, [facility] for [facility]      2. Have seen or consulted any other health care provider since your last visit? If yes, where when, and reason for visit? Yes, Mammogram: Dr. Mona Zee        Patient is accompanied by self  I have received verbal consent from Zeyad Wong to discuss any/all medical information while they are present in the room.

## 2020-08-30 PROBLEM — M85.80 OSTEOPENIA: Status: ACTIVE | Noted: 2020-08-30

## 2020-08-30 PROBLEM — F17.200 SMOKER: Status: ACTIVE | Noted: 2020-08-30

## 2020-08-30 PROBLEM — N81.4 CYSTOCELE WITH PROLAPSE: Status: ACTIVE | Noted: 2020-08-30

## 2020-08-30 PROBLEM — Z78.0 MENOPAUSE: Status: ACTIVE | Noted: 2020-08-30

## 2020-09-16 RX ORDER — LEVONORGESTREL AND ETHINYL ESTRADIOL 6-5-10
KIT ORAL
Qty: 84 TAB | Refills: 0 | Status: SHIPPED | OUTPATIENT
Start: 2020-09-16 | End: 2020-12-07 | Stop reason: SDUPTHER

## 2020-10-06 ENCOUNTER — VIRTUAL VISIT (OUTPATIENT)
Dept: FAMILY MEDICINE CLINIC | Age: 60
End: 2020-10-06
Payer: COMMERCIAL

## 2020-10-06 DIAGNOSIS — F41.9 ANXIETY: ICD-10-CM

## 2020-10-06 PROCEDURE — 99442 PR PHYS/QHP TELEPHONE EVALUATION 11-20 MIN: CPT | Performed by: FAMILY MEDICINE

## 2020-10-06 RX ORDER — CLORAZEPATE DIPOTASSIUM 3.75 MG/1
3.75 TABLET ORAL 3 TIMES DAILY
Qty: 90 TAB | Refills: 2 | Status: SHIPPED | OUTPATIENT
Start: 2020-10-06 | End: 2021-01-11 | Stop reason: SDUPTHER

## 2020-10-06 NOTE — PROGRESS NOTES
Jim Tsang is a 61 y.o. female      Chief Complaint   Patient presents with    Medication Refill     Clorazepate          1. Have you been to the ER, urgent care clinic since your last visit? Hospitalized since your last visit? [facility]   5/[facility]/  & 6/2020  ( uti )       2. Have you seen or consulted any other health care providers outside of the [address] [place] [address] since your last visit? Include any pap smears or colon screening.    NO

## 2020-10-06 NOTE — PROGRESS NOTES
Dory Munoz is a 61 y.o. female evaluated via telephone on 10/6/2020. Consent:  She and/or health care decision maker is aware that she may receive a bill for this telephone service, depending on her insurance coverage, and has provided verbal consent to proceed: Yes    Unable to connect for scheduled video visit so entire visit done over the phone. Documentation:  I communicated with the patient and/or health care decision maker about medication refill. .   Details of this discussion including any medical advice provided: anxiety    Still stressed by work   Remains on same dose of benzo. No problems with med, no increase or decrease in usage   checked and is OK. No other providers or meds other than short course of T3's    Assessment and Plan:    1. Anxiety  Continue meds, FU 3 months. No signs of misuse or diversion  - clorazepate (TRANXENE) 3.75 mg tablet; Take 1 Tab by mouth three (3) times daily. Max Daily Amount: 11.25 mg. Dispense: 90 Tab; Refill: 2      Follow-up and Dispositions    · Return in about 3 months (around 1/6/2021) for Medication follow up. I affirm this is a Patient Initiated Episode with a Patient who has not had a related appointment within my department in the past 7 days or scheduled within the next 24 hours. Total Time: minutes: 11-20 minutes    Note: not billable if this call serves to triage the patient into an appointment for the relevant concern    The patient was at home and I was in office for this phone visit.     Joanne Marks MD

## 2020-12-07 ENCOUNTER — OFFICE VISIT (OUTPATIENT)
Dept: OBGYN CLINIC | Age: 60
End: 2020-12-07
Payer: COMMERCIAL

## 2020-12-07 VITALS
BODY MASS INDEX: 26.8 KG/M2 | DIASTOLIC BLOOD PRESSURE: 68 MMHG | WEIGHT: 151.25 LBS | SYSTOLIC BLOOD PRESSURE: 114 MMHG | HEIGHT: 63 IN

## 2020-12-07 VITALS — HEIGHT: 63 IN | BODY MASS INDEX: 27.36 KG/M2

## 2020-12-07 DIAGNOSIS — Z12.4 SCREENING FOR MALIGNANT NEOPLASM OF CERVIX: Primary | ICD-10-CM

## 2020-12-07 DIAGNOSIS — Z78.0 OSTEOPENIA AFTER MENOPAUSE: ICD-10-CM

## 2020-12-07 DIAGNOSIS — M85.80 OSTEOPENIA AFTER MENOPAUSE: ICD-10-CM

## 2020-12-07 DIAGNOSIS — R35.0 URINARY FREQUENCY: ICD-10-CM

## 2020-12-07 DIAGNOSIS — N81.11 CYSTOCELE, MIDLINE: ICD-10-CM

## 2020-12-07 DIAGNOSIS — Z01.419 ROUTINE GYNECOLOGICAL EXAMINATION: ICD-10-CM

## 2020-12-07 DIAGNOSIS — N95.1 MENOPAUSAL SYNDROME: ICD-10-CM

## 2020-12-07 DIAGNOSIS — N81.4 CYSTOCELE WITH PROLAPSE: ICD-10-CM

## 2020-12-07 PROCEDURE — 99396 PREV VISIT EST AGE 40-64: CPT | Performed by: OBSTETRICS & GYNECOLOGY

## 2020-12-07 RX ORDER — LEVONORGESTREL AND ETHINYL ESTRADIOL 6-5-10
1 KIT ORAL DAILY
Qty: 84 TAB | Refills: 3 | Status: SHIPPED | OUTPATIENT
Start: 2020-12-07 | End: 2020-12-07 | Stop reason: SDUPTHER

## 2020-12-07 RX ORDER — LEVONORGESTREL AND ETHINYL ESTRADIOL 6-5-10
1 KIT ORAL DAILY
Qty: 84 TAB | Refills: 3 | Status: SHIPPED | OUTPATIENT
Start: 2020-12-07 | End: 2020-12-07

## 2020-12-07 RX ORDER — LEVONORGESTREL AND ETHINYL ESTRADIOL 6-5-10
1 KIT ORAL DAILY
Qty: 84 TAB | Refills: 3 | Status: SHIPPED | OUTPATIENT
Start: 2020-12-07 | End: 2021-05-27 | Stop reason: ALTCHOICE

## 2020-12-07 RX ORDER — NITROFURANTOIN 25; 75 MG/1; MG/1
CAPSULE ORAL
Qty: 30 CAP | Refills: 1 | Status: SHIPPED | OUTPATIENT
Start: 2020-12-07 | End: 2021-05-20

## 2020-12-07 NOTE — PROGRESS NOTES
Laura Herbert is a , 61 y.o. female   No LMP recorded (lmp unknown). Patient is postmenopausal.    She presents for her annual    She is having no significant problems. has had 4 UTI's this past year- DWP any assoc with intercourse or other occurrences- unable to ascertain, also notes increased urinary frequency feels like she has to go about every hour      Menstrual status:  Her periods are: menopause. Cycles are: menopause. She does not have dysmenorrhea. Medical conditions:  Since her last annual GYN exam about one year ago, she has not the following changes in her health history: none. Past Medical History:   Diagnosis Date    Anxiety     Cystocele with prolapse 2020    Menopause 2020    Osteopenia 2020    Smoker 2020     Past Surgical History:   Procedure Laterality Date    HX BREAST BIOPSY      X2 normal    HX BREAST BIOPSY      HX CHOLECYSTECTOMY      HX GI      cholecystectomy       Prior to Admission medications    Medication Sig Start Date End Date Taking? Authorizing Provider   levonorgestrel-ethinyl estradiol (ENPRESSE) 50-30 (6)/75-40 (5)/125-30(10) tab Take 1 Tab by mouth daily. 20  Yes Adarsh Bazan MD   clorazepate (TRANXENE) 3.75 mg tablet Take 1 Tab by mouth three (3) times daily. Max Daily Amount: 11.25 mg. 10/6/20  Yes John Small MD   latanoprost (XALATAN) 0.005 % ophthalmic solution INSTILL 1 DROP INTO BOTH EYES QHS 18  Yes Provider, Historical       No Known Allergies       Tobacco History:  reports that she has been smoking cigarettes. She has been smoking about 0.50 packs per day. She has never used smokeless tobacco.  Alcohol Abuse:  reports no history of alcohol use. Drug Abuse:  reports no history of drug use.     Family Medical/Cancer History:   Family History   Problem Relation Age of Onset    Other Mother         brain tumor    Diabetes Maternal Aunt           Review of Systems   Constitutional: Negative for chills, fever, malaise/fatigue and weight loss. HENT: Negative for congestion, ear pain, sinus pain and tinnitus. Eyes: Negative for blurred vision and double vision. Respiratory: Negative for cough, shortness of breath and wheezing. Cardiovascular: Negative for chest pain and palpitations. Gastrointestinal: Negative for abdominal pain, blood in stool, constipation, diarrhea, heartburn, nausea and vomiting. Genitourinary: Negative for dysuria, flank pain, frequency, hematuria and urgency. Musculoskeletal: Negative for joint pain and myalgias. Skin: Negative for itching and rash. Neurological: Negative for dizziness, weakness and headaches. Psychiatric/Behavioral: Negative for depression, memory loss and suicidal ideas. The patient is not nervous/anxious and does not have insomnia. Physical Exam  Constitutional:       Appearance: Normal appearance. HENT:      Head: Normocephalic and atraumatic. Cardiovascular:      Rate and Rhythm: Normal rate. Heart sounds: Normal heart sounds. Pulmonary:      Effort: Pulmonary effort is normal.      Breath sounds: Normal breath sounds. Chest:      Breasts:         Right: Normal.         Left: Normal.   Abdominal:      General: Abdomen is flat. Palpations: Abdomen is soft. Genitourinary:     General: Normal vulva. Vagina: Normal.      Cervix: Normal.      Uterus: Normal.       Adnexa: Right adnexa normal and left adnexa normal.      Rectum: Normal.      Comments: PAP Obtained  Neurological:      Mental Status: She is alert. Psychiatric:         Mood and Affect: Mood normal.         Behavior: Behavior normal.         Thought Content: Thought content normal.          Visit Vitals  /68 (BP 1 Location: Left arm, BP Patient Position: Sitting)   Ht 5' 2.5\" (1.588 m)   Wt 151 lb 4 oz (68.6 kg)   LMP  (LMP Unknown)   BMI 27.22 kg/m²         Assessment:  Diagnoses and all orders for this visit:    1.  Screening for malignant neoplasm of cervix  -     PAP, LB, RFX HPV [id number]([id number])    2. Routine gynecological examination  -     PAP, LB, RFX HPV EYZZG([id number])    3. Osteopenia after menopause  -     DEXA BONE DENSITY STUDY AXIAL; Future    4. Menopausal syndrome    5. Cystocele, midline    6. Cystocele with prolapse    7. Urinary frequency    Other orders  -     levonorgestrel-ethinyl estradiol (ENPRESSE) 50-30 (6)/75-40 (5)/125-30(10) tab; Take 1 Tab by mouth daily. Options DWP re: Cystocele with prolapse- desires to try Kegel's. DWP abxs post coital if it seems that is the cause or UTI's. Plan:Questions addressed  Counseled re: diet, exercise, healthy lifestyle  Return for Annual  Rec annual mammogram  Rec: DEXA        Follow-up and Dispositions    · Return for Mammogram, 1 yr annual, 1 yr mammo.

## 2020-12-10 LAB
CYTOLOGIST CVX/VAG CYTO: NORMAL
CYTOLOGY CVX/VAG DOC CYTO: NORMAL
DX ICD CODE: NORMAL
LABCORP, 190119: NORMAL
Lab: NORMAL
OTHER STN SPEC: NORMAL
STAT OF ADQ CVX/VAG CYTO-IMP: NORMAL

## 2021-01-06 DIAGNOSIS — M85.80 OSTEOPENIA AFTER MENOPAUSE: ICD-10-CM

## 2021-01-06 DIAGNOSIS — Z78.0 OSTEOPENIA AFTER MENOPAUSE: ICD-10-CM

## 2021-01-06 PROCEDURE — 77080 DXA BONE DENSITY AXIAL: CPT | Performed by: OBSTETRICS & GYNECOLOGY

## 2021-01-07 NOTE — PROGRESS NOTES
1/2021: DEXA: Osteopenia(Hips:  T-score: -2.0)  Spine: Normal  LVM explaining  Rec: ca/Vit. D  Rescan in 2yrs

## 2021-01-11 ENCOUNTER — VIRTUAL VISIT (OUTPATIENT)
Dept: FAMILY MEDICINE CLINIC | Age: 61
End: 2021-01-11
Payer: COMMERCIAL

## 2021-01-11 DIAGNOSIS — F41.9 ANXIETY: ICD-10-CM

## 2021-01-11 PROCEDURE — 99442 PR PHYS/QHP TELEPHONE EVALUATION 11-20 MIN: CPT | Performed by: FAMILY MEDICINE

## 2021-01-11 RX ORDER — CLORAZEPATE DIPOTASSIUM 3.75 MG/1
3.75 TABLET ORAL 3 TIMES DAILY
Qty: 90 TAB | Refills: 2 | Status: SHIPPED | OUTPATIENT
Start: 2021-01-11 | End: 2021-05-20 | Stop reason: SDUPTHER

## 2021-01-11 NOTE — PROGRESS NOTES
Williams Jaffe is a 61 y.o. female evaluated via telephone on 1/11/2021. Consent:  She and/or health care decision maker is aware that she may receive a bill for this telephone service, depending on her insurance coverage, and has provided verbal consent to proceed: Yes      Documentation:  I communicated with the patient and/or health care decision maker about med refill. Details of this discussion including any medical advice provided:     Needs refill of clorazepate  Still has stress:  Boss and family. Still takes TID. Family has been OK with COVID. Extended family:  Niece had covid but is fine. Assessment and Plan:    1. Anxiety  Refilled meds   FU for physical in the spring  COVID vaccine and other issues discussed. - clorazepate (TRANXENE) 3.75 mg tablet; Take 1 Tab by mouth three (3) times daily. Max Daily Amount: 11.25 mg. Dispense: 90 Tab; Refill: 2      Follow-up and Dispositions    · Return in about 3 months (around 4/11/2021) for Medication follow up, Annual exam.         I affirm this is a Patient Initiated Episode with a Patient who has not had a related appointment within my department in the past 7 days or scheduled within the next 24 hours. Total Time: minutes: 11-20 minutes    Note: not billable if this call serves to triage the patient into an appointment for the relevant concern    The patient was at home and I was in office for this phone visit.     Xavier Soto MD

## 2021-01-11 NOTE — PROGRESS NOTES
Leland Montero is a 61 y.o. female      Chief Complaint   Patient presents with    Medication Refill     Clorazepate          1. Have you been to the ER, urgent care clinic since your last visit? Hospitalized since your last visit? No      2. Have you seen or consulted any other health care providers outside of the [address] [place] [address] since your last visit? Include any pap smears or colon screening.   No

## 2021-01-22 ENCOUNTER — TELEPHONE (OUTPATIENT)
Dept: OBGYN CLINIC | Age: 61
End: 2021-01-22

## 2021-01-22 NOTE — TELEPHONE ENCOUNTER
Patient called regarding her c/o urinary frequency that she discussed with Dr Priyanka Anderson at her last visit. She is asking about a referral to urology. She was given the contact information for [facility].

## 2021-01-26 ENCOUNTER — TELEPHONE (OUTPATIENT)
Dept: OBGYN CLINIC | Age: 61
End: 2021-01-26

## 2021-01-26 NOTE — TELEPHONE ENCOUNTER
Patient called stating she was seen at [facility] yesterday and is scheduled to have an ultrasound at [address] [place] [address] and follow up with the urologist next week. States a pessary was discussed. She is questioning charges at that office. Advised her I have no information regarding another office's charges and she has a deductible that has to be met. States she understands. She was advised to contact her insurance and they can tell her how much has been applied to her deductible. States she may contact our office regarding the pessary.

## 2021-02-26 ENCOUNTER — TELEPHONE (OUTPATIENT)
Dept: OBGYN CLINIC | Age: 61
End: 2021-02-26

## 2021-02-26 NOTE — TELEPHONE ENCOUNTER
Patient called wanting to know if it's normal to see a little blood with urination. [facility] a history of recurrent UTI's. [facility] she has been seeing the nurse practitioner at [facility] and was diagnosed with a UTI and prescribed Macrobid as it didn't help. [facility] she was also fitted with a pessary but had to be refitted with a different one as the first one didn't work. [facility] she is still having frequency and ended up going to [facility] this past weekend and was prescribed a different antibiotic. She is scheduled to see Dr Rajiv Perry on 03/01/21. [facility] she is currently taking Enpresse for HRT but still has a cycle. She states it is extremely light this time. Advised her to call be back on Monday after I speak with Dr Leeroy Osman to ask him if he wants her to switch to a monophasic pill or one of the HRT formulations.

## 2021-03-16 NOTE — TELEPHONE ENCOUNTER
3/16/21: RTC to discuss  Message that was forwarded.   LVM to call  Back if still having issues- options and causes left on VM- UTI, irritation from the pessary or due to meds

## 2021-05-20 ENCOUNTER — VIRTUAL VISIT (OUTPATIENT)
Dept: FAMILY MEDICINE CLINIC | Age: 61
End: 2021-05-20
Payer: COMMERCIAL

## 2021-05-20 DIAGNOSIS — F41.9 ANXIETY: ICD-10-CM

## 2021-05-20 PROCEDURE — 99442 PR PHYS/QHP TELEPHONE EVALUATION 11-20 MIN: CPT | Performed by: FAMILY MEDICINE

## 2021-05-20 RX ORDER — CLORAZEPATE DIPOTASSIUM 3.75 MG/1
3.75 TABLET ORAL 3 TIMES DAILY
Qty: 90 TABLET | Refills: 2 | Status: SHIPPED | OUTPATIENT
Start: 2021-05-20 | End: 2021-08-12 | Stop reason: SDUPTHER

## 2021-05-20 NOTE — PROGRESS NOTES
Amarilys Batres is a 61 y.o. female evaluated via telephone on 5/20/2021. Consent:  She and/or health care decision maker is aware that she may receive a bill for this telephone service, depending on her insurance coverage, and has provided verbal consent to proceed: Yes    Unable to connect for video visit so entire visit done over the phone    Documentation:  I communicated with the patient and/or health care decision maker about anxiety and meds. Details of this discussion including any medical advice provided:     Recent Urologic surgery. Hysterrectomy and anterior repair. Robotic  March 2021  Now back at work and doing ok  Remains on [employer]    Doing OK emotionally   Remains on Tranxene  No problems with COVID epidemic within her family  No problems getting Tranxene from pharmacy. Assessment and Plan:    1. Anxiety  Refilled med. Due for physical next visit   checked and is OK  - clorazepate (TRANXENE) 3.75 mg tablet; Take 1 Tablet by mouth three (3) times daily. Max Daily Amount: 11.25 mg. Dispense: 90 Tablet; Refill: 2      Follow-up and Dispositions    · Return in about 3 months (around 8/20/2021) for Medication follow up. I affirm this is a Patient Initiated Episode with a Patient who has not had a related appointment within my department in the past 7 days or scheduled within the next 24 hours. Total Time: minutes: 11-20 minutes    Note: not billable if this call serves to triage the patient into an appointment for the relevant concern    The patient was at home and I was in office for this phone visit.     Femi Brown MD

## 2021-05-20 NOTE — PROGRESS NOTES
Geovanny Godinez is a 61 y.o. female      Chief Complaint   Patient presents with    Medication Refill     Clorazepate          1. Have you been to the ER, urgent care clinic since your last visit? Hospitalized since your last visit? No       2. Have you seen or consulted any other health care providers outside of the [address] [place] [address] since your last visit? Include any pap smears or colon screening Yes urology .

## 2021-05-27 ENCOUNTER — TELEPHONE (OUTPATIENT)
Dept: OBGYN CLINIC | Age: 61
End: 2021-05-27

## 2021-05-27 DIAGNOSIS — N95.1 SYMPTOMATIC MENOPAUSAL OR FEMALE CLIMACTERIC STATES: Primary | ICD-10-CM

## 2021-05-27 RX ORDER — ESTRADIOL 0.5 MG/1
0.5 TABLET ORAL DAILY
Qty: 90 TABLET | Refills: 2 | Status: SHIPPED | OUTPATIENT
Start: 2021-05-27 | End: 2021-11-29

## 2021-05-27 NOTE — TELEPHONE ENCOUNTER
Returned the patient's call and she stastes she needs a refill on medication. She states she has had a hysterectomy since she saw Dr Christiana Yost last.  States Dr Mala Connor performed her hysterectomy. Per Dr Christiana Yost, prescription for Estradiol 0.5 mg sent to the patient's pharmacy.

## 2021-08-12 ENCOUNTER — TELEPHONE (OUTPATIENT)
Dept: FAMILY MEDICINE CLINIC | Age: 61
End: 2021-08-12

## 2021-08-12 ENCOUNTER — VIRTUAL VISIT (OUTPATIENT)
Dept: FAMILY MEDICINE CLINIC | Age: 61
End: 2021-08-12
Payer: COMMERCIAL

## 2021-08-12 DIAGNOSIS — F41.9 ANXIETY: ICD-10-CM

## 2021-08-12 DIAGNOSIS — F41.9 ANXIETY: Primary | ICD-10-CM

## 2021-08-12 DIAGNOSIS — Z12.11 SCREENING FOR COLON CANCER: ICD-10-CM

## 2021-08-12 PROCEDURE — 99443 PR PHYS/QHP TELEPHONE EVALUATION 21-30 MIN: CPT | Performed by: FAMILY MEDICINE

## 2021-08-12 RX ORDER — CLORAZEPATE DIPOTASSIUM 3.75 MG/1
3.75 TABLET ORAL 3 TIMES DAILY
Qty: 90 TABLET | Refills: 2 | Status: SHIPPED | OUTPATIENT
Start: 2021-08-12 | End: 2021-10-29 | Stop reason: SDUPTHER

## 2021-08-12 RX ORDER — CLORAZEPATE DIPOTASSIUM 3.75 MG/1
3.75 TABLET ORAL 3 TIMES DAILY
Qty: 90 TABLET | Refills: 2 | Status: SHIPPED | OUTPATIENT
Start: 2021-08-12 | End: 2021-08-12 | Stop reason: SDUPTHER

## 2021-08-12 NOTE — PROGRESS NOTES
Maryjane Wright is a 61 y.o. female evaluated via telephone on 8/12/2021. Consent:  She and/or health care decision maker is aware that she may receive a bill for this telephone service, depending on her insurance coverage, and has provided verbal consent to proceed: Yes    Unable to connect for scheduled video visit so entire visit done by phone. Documentation:  I communicated with the patient and/or health care decision maker about medication. Details of this discussion including any medical advice provided:     Still struggling with previous bladder surgery  Seeing UROLOGY    Anxiety  Still on clorazepate TID  Difficulties at work and worries about COVID   Needs refill   ok    Assessment and Plan:    1. Anxiety  - clorazepate (TRANXENE) 3.75 mg tablet; Take 1 Tablet by mouth three (3) times daily. Max Daily Amount: 11.25 mg. Dispense: 90 Tablet; Refill: 2    2. Screening for colon cancer  Never colonoscopy  To Dr. Ousmane De La Cruz.  - Sanam Andrade and Dispositions    · Return in about 3 months (around 11/12/2021) for Medication follow up. I affirm this is a Patient Initiated Episode with a Patient who has not had a related appointment within my department in the past 7 days or scheduled within the next 24 hours. Total Time: minutes: 21-30 minutes    Note: not billable if this call serves to triage the patient into an appointment for the relevant concern    The patient was at home and I was in office for this phone visit.     Maite Judd MD

## 2021-08-12 NOTE — TELEPHONE ENCOUNTER
----- Message from Mellisa Willingham sent at 8/12/2021 11:38 AM EDT -----  Regarding: Dr. Vaughn Jeanette: [phone]  General Message/Vendor Calls    Caller's first and last name: N/A      Reason for call: Please transfer clorazepate (TRANXENE) 3.75 mg tablet  prescription from [facility] to [facility] [address] in [place] (phone: [phone])      Callback required yes/no and why: yes/confirm transferred      Best contact number(s): [phone]      Details to clarify the request: PT states [facility] never has this medication in stock.        Mellisa Willingham

## 2021-08-12 NOTE — PROGRESS NOTES
Patient stated name &     Chief Complaint   Patient presents with    Medication Refill        Health Maintenance Due   Topic    Pneumococcal 0-64 years (1 of 2 - PPSV23)    COVID-19 Vaccine (1)    DTaP/Tdap/Td series (1 - Tdap)    Colorectal Cancer Screening Combo     Shingrix Vaccine Age 50> (1 of 2)    Breast Cancer Screen Mammogram        Wt Readings from Last 3 Encounters:   20 151 lb 4 oz (68.6 kg)   20 152 lb (68.9 kg)   20 152 lb (68.9 kg)     Temp Readings from Last 3 Encounters:   20 98.4 °F (36.9 °C) (Oral)   10/28/19 98.5 °F (36.9 °C) (Oral)   10/14/19 98.2 °F (36.8 °C) (Oral)     BP Readings from Last 3 Encounters:   20 114/68   20 112/64   10/28/19 110/72     Pulse Readings from Last 3 Encounters:   20 83   10/28/19 87   10/14/19 82         Learning Assessment:  :     Learning Assessment 10/10/2018   PRIMARY LEARNER Patient   HIGHEST LEVEL OF EDUCATION - PRIMARY LEARNER  GRADUATED HIGH SCHOOL OR GED   PRIMARY LANGUAGE ENGLISH   LEARNER PREFERENCE PRIMARY LISTENING   ANSWERED BY self   RELATIONSHIP SELF       Depression Screening:  :     3 most recent PHQ Screens 2020   Little interest or pleasure in doing things Not at all   Feeling down, depressed, irritable, or hopeless Not at all   Total Score PHQ 2 0       Fall Risk Assessment:  :     Fall Risk Assessment, last 12 mths 2019   Able to walk? Yes   Fall in past 12 months? No       Abuse Screening:  :     Abuse Screening Questionnaire 2019   Do you ever feel afraid of your partner? N   Are you in a relationship with someone who physically or mentally threatens you? N   Is it safe for you to go home? Y       Coordination of Care Questionnaire:  :     1) Have you been to an emergency room, urgent care clinic since your last  No     Hospitalized since your last visit?  No             2) Have you seen or consulted any other health care providers outside of [address] [place] [address] since your last visit? No  (Include any pap smears or colon screenings in this section.)    Patient is accompanied by VV I have received verbal consent from Laura Bellamy to discuss any/all medical information while they are present in the room.

## 2021-08-12 NOTE — TELEPHONE ENCOUNTER
08/12/21  3:45 PM    1. Anxiety  rx resent to [facility] at patient's request.  - clorazepate (TRANXENE) 3.75 mg tablet; Take 1 Tablet by mouth three (3) times daily. Max Daily Amount: 11.25 mg. Dispense: 90 Tablet;  Refill: 2      Maite Judd MD

## 2021-10-26 DIAGNOSIS — F41.9 ANXIETY: ICD-10-CM

## 2021-10-28 RX ORDER — CLORAZEPATE DIPOTASSIUM 3.75 MG/1
3.75 TABLET ORAL 3 TIMES DAILY
Qty: 90 TABLET | Refills: 2 | OUTPATIENT
Start: 2021-10-28

## 2021-10-28 NOTE — TELEPHONE ENCOUNTER
Needs appt for benzo refill.   Can we add to end of Friday as a VV?  [facility]

## 2021-10-29 ENCOUNTER — VIRTUAL VISIT (OUTPATIENT)
Dept: FAMILY MEDICINE CLINIC | Age: 61
End: 2021-10-29
Payer: COMMERCIAL

## 2021-10-29 DIAGNOSIS — F41.9 ANXIETY: ICD-10-CM

## 2021-10-29 PROCEDURE — 99213 OFFICE O/P EST LOW 20 MIN: CPT | Performed by: FAMILY MEDICINE

## 2021-10-29 RX ORDER — CLORAZEPATE DIPOTASSIUM 3.75 MG/1
3.75 TABLET ORAL 3 TIMES DAILY
Qty: 90 TABLET | Refills: 2 | Status: SHIPPED | OUTPATIENT
Start: 2021-10-29 | End: 2022-02-16 | Stop reason: SDUPTHER

## 2021-10-29 NOTE — PROGRESS NOTES
Samara May is a 61 y.o. female      Chief Complaint   Patient presents with    Follow-up      Medication         1. Have you been to the ER, urgent care clinic since your last visit? No  Hospitalized since your last visit? No       2. Have you seen or consulted any other health care providers outside of the [address] [place] [address] since your last visit? Include any pap smears or colon screening.    No

## 2021-10-29 NOTE — PROGRESS NOTES
Mariano Barahona is a 61 y.o. female who was seen by synchronous (real-time) audio-video technology on 10/29/2021. Consent: Mariano Barahona, who was seen by synchronous (real-time) audio-video technology, and/or her healthcare decision maker, is aware that this patient-initiated, Telehealth encounter on 10/29/2021 is a billable service, with coverage as determined by her insurance carrier. She is aware that she may receive a bill and has provided verbal consent to proceed: Yes. Assessment & Plan:   Diagnoses and all orders for this visit:    1. Anxiety  -     clorazepate (TRANXENE) 3.75 mg tablet; Take 1 Tablet by mouth three (3) times daily. Max Daily Amount: 11.25 mg. Doing well emotionally despite recent hysterectomy and bladder repair.  looks ok. Only post op pain meds and no other controlled substances      Follow-up and Dispositions    · Return in about 3 months (around 1/29/2022) for Medication follow up. I spent at least 23 minutes on this visit with this established patient. ([id number])    Subjective:   Mariano Barahona is a 61 y.o. female who was seen for Follow-up ( Medication)    Recent PT for post op for bladder prolapse and hysterectomy. Doing well emotionally otherwise  Remains on benzo   OK  Has been somewhat impatient at home and work but otherwise no sx    Prior to Admission medications    Medication Sig Start Date End Date Taking? Authorizing Provider   mirabegron ER (MYRBETRIQ) 25 mg ER tablet Take 25 mg by mouth daily. Yes Provider, Historical   clorazepate (TRANXENE) 3.75 mg tablet Take 1 Tablet by mouth three (3) times daily. Max Daily Amount: 11.25 mg. 10/29/21  Yes Khushi Vargas MD   clorazepate (TRANXENE) 3.75 mg tablet Take 1 Tablet by mouth three (3) times daily. Max Daily Amount: 11.25 mg. 8/12/21 10/29/21  Khushi Vargas MD   estradioL (ESTRACE) 0.5 mg tablet Take 1 Tablet by mouth daily.  5/27/21   Tobin Cornejo MD   latanoprost (XALATAN) 0.005 % ophthalmic solution INSTILL 1 DROP INTO BOTH EYES [facility] 8/24/18   Provider, Historical     No Known Allergies        Review of Systems   Respiratory: Negative for shortness of breath. Cardiovascular: Negative for chest pain. Psychiatric/Behavioral: Negative for depression. The patient is nervous/anxious. Objective:   Vital Signs: (As obtained by patient/caregiver at home)  Visit Vitals  LMP  (LMP Unknown)        [INSTRUCTIONS:  \"[x]\" Indicates a positive item  \"[]\" Indicates a negative item  -- DELETE ALL ITEMS NOT EXAMINED]    Constitutional: [x] Appears well-developed and well-nourished [x] No apparent distress      [] Abnormal -     Mental status: [x] Alert and awake  [x] Oriented to person/place/time [x] Able to follow commands    [] Abnormal -     Eyes:   EOM    [x]  Normal    [] Abnormal -   Sclera  [x]  Normal    [] Abnormal -          Discharge [x]  None visible   [] Abnormal -     HENT: [x] Normocephalic, atraumatic  [] Abnormal -   [x] Mouth/Throat: Mucous membranes are moist    External Ears [x] Normal  [] Abnormal -    Neck: [x] No visualized mass [] Abnormal -     Pulmonary/Chest: [x] Respiratory effort normal   [x] No visualized signs of difficulty breathing or respiratory distress        [] Abnormal -      Musculoskeletal:   [x] Normal gait with no signs of ataxia         [x] Normal range of motion of neck        [] Abnormal -     Neurological:        [x] No Facial Asymmetry (Cranial nerve 7 motor function) (limited exam due to video visit)          [x] No gaze palsy        [] Abnormal -          Skin:        [x] No significant exanthematous lesions or discoloration noted on facial skin         [] Abnormal -            Psychiatric:       [x] Normal Affect [] Abnormal -        [x] No Hallucinations    Other pertinent observable physical exam findings:-        We discussed the expected course, resolution and complications of the diagnosis(es) in detail.   Medication risks, benefits, costs, interactions, and alternatives were discussed as indicated. I advised her to contact the office if her condition worsens, changes or fails to improve as anticipated. She expressed understanding with the diagnosis(es) and plan. Mariano Barahona is a 61 y.o. female who was evaluated by a video visit encounter for concerns as above. Patient identification was verified prior to start of the visit. A caregiver was present when appropriate. Due to this being a TeleHealth encounter (During BBFTZ-65 public health emergency), evaluation of the following organ systems was limited: Vitals/Constitutional/EENT/Resp/CV/GI//MS/Neuro/Skin/Heme-Lymph-Imm. Pursuant to the emergency declaration under the [facility], 1135 waiver authority and the [employer] and [employer] Act, this Virtual  Visit was conducted, with patient's (and/or legal guardian's) consent, to reduce the patient's risk of exposure to COVID-19 and provide necessary medical care. Services were provided through a video synchronous discussion virtually to substitute for in-person clinic visit. Patient was at home and I was in office for this video visit. Robin Kwon.  Katherine Gan MD

## 2022-02-16 ENCOUNTER — VIRTUAL VISIT (OUTPATIENT)
Dept: FAMILY MEDICINE CLINIC | Age: 62
End: 2022-02-16
Payer: COMMERCIAL

## 2022-02-16 DIAGNOSIS — F41.9 ANXIETY: ICD-10-CM

## 2022-02-16 PROCEDURE — 99442 PR PHYS/QHP TELEPHONE EVALUATION 11-20 MIN: CPT | Performed by: FAMILY MEDICINE

## 2022-02-16 RX ORDER — CLORAZEPATE DIPOTASSIUM 3.75 MG/1
3.75 TABLET ORAL 3 TIMES DAILY
Qty: 90 TABLET | Refills: 2 | Status: SHIPPED | OUTPATIENT
Start: 2022-02-16 | End: 2022-05-06 | Stop reason: SDUPTHER

## 2022-02-16 NOTE — PROGRESS NOTES
Anika Jones is a 64 y.o. female      Chief Complaint   Patient presents with    Medication Refill     Clorazepate          1. Have you been to the ER, urgent care clinic since your last visit? Hospitalized since your last visit? No       2. Have you seen or consulted any other health care providers outside of the [address] [place] [address] since your last visit? Include any pap smears or colon screening.    No

## 2022-02-16 NOTE — PROGRESS NOTES
Anika Jones is a 64 y.o. female evaluated via telephone on 2/16/2022. Consent:  She and/or health care decision maker is aware that she may receive a bill for this telephone service, depending on her insurance coverage, and has provided verbal consent to proceed: Yes      Documentation:  I communicated with the patient and/or health care decision maker about meds  . Details of this discussion including any medical advice provided:     More anxiety than she had previously  Worries about COVID and masks  Jittery some days  Still working which is OK  Sometimes cannot go back to sleep if she gets up to let dog out. Assessment and Plan:    1. Anxiety  Refilled   is OK  Stressors discussed. FU 3 months  May try bedtime melatonin. - clorazepate (TRANXENE) 3.75 mg tablet; Take 1 Tablet by mouth three (3) times daily. Max Daily Amount: 11.25 mg. Dispense: 90 Tablet; Refill: 2      Follow-up and Dispositions    · Return in about 3 months (around 5/16/2022) for Annual exam, Medication follow up. I affirm this is a Patient Initiated Episode with a Patient who has not had a related appointment within my department in the past 7 days or scheduled within the next 24 hours. Total Time: minutes: 11-20 minutes    Note: not billable if this call serves to triage the patient into an appointment for the relevant concern    The patient was at home and I was in office for this phone visit.     Radha Orlando MD

## 2022-03-10 ENCOUNTER — TELEPHONE (OUTPATIENT)
Dept: FAMILY MEDICINE CLINIC | Age: 62
End: 2022-03-10

## 2022-03-10 NOTE — TELEPHONE ENCOUNTER
----- Message from Shila Villeda sent at 3/10/2022  9:33 AM EST -----  Subject: Message to Provider    QUESTIONS  Information for Provider? Patient is requesting a call back from Dr. Pedro White MA/Nurse regarding an issue she is having going to the bathroom. She has not had a bowel movement in about 4 days, and is also vomiting. Pt   also states she is belching a lot more than normal. Pt states she can sit   in the bathroom for 45 minutes and still not have a bowel movement, feels   this issue is more than just constipation as it is starting to become   painful. Please call pt back as soon as possible. Thank you!   ---------------------------------------------------------------------------  --------------  CALL BACK INFO  What is the best way for the office to contact you? OK to leave message on   voicemail  Preferred Call Back Phone Number? [phone]  ---------------------------------------------------------------------------  --------------  SCRIPT ANSWERS  Relationship to Patient?  Self

## 2022-03-10 NOTE — TELEPHONE ENCOUNTER
Call her. We really should see her if she is having pain.   Can try dulcolax or enema if she is not having pain  [facility]

## 2022-03-18 PROBLEM — M85.80 OSTEOPENIA: Status: ACTIVE | Noted: 2020-08-30

## 2022-03-19 PROBLEM — F41.9 ANXIETY: Status: ACTIVE | Noted: 2018-10-09

## 2022-03-19 PROBLEM — N81.4 CYSTOCELE WITH PROLAPSE: Status: ACTIVE | Noted: 2020-08-30

## 2022-03-19 PROBLEM — F17.200 SMOKER: Status: ACTIVE | Noted: 2020-08-30

## 2022-03-19 PROBLEM — Z78.0 MENOPAUSE: Status: ACTIVE | Noted: 2020-08-30

## 2022-04-13 ENCOUNTER — OFFICE VISIT (OUTPATIENT)
Dept: OBGYN CLINIC | Age: 62
End: 2022-04-13
Payer: COMMERCIAL

## 2022-04-13 VITALS
SYSTOLIC BLOOD PRESSURE: 112 MMHG | DIASTOLIC BLOOD PRESSURE: 68 MMHG | BODY MASS INDEX: 25.89 KG/M2 | WEIGHT: 146.13 LBS | HEIGHT: 63 IN

## 2022-04-13 DIAGNOSIS — Z01.419 ROUTINE GYNECOLOGICAL EXAMINATION: ICD-10-CM

## 2022-04-13 DIAGNOSIS — N95.1 SYMPTOMATIC MENOPAUSAL OR FEMALE CLIMACTERIC STATES: ICD-10-CM

## 2022-04-13 DIAGNOSIS — N95.1 MENOPAUSAL SYNDROME: ICD-10-CM

## 2022-04-13 DIAGNOSIS — Z12.4 SCREENING FOR MALIGNANT NEOPLASM OF CERVIX: Primary | ICD-10-CM

## 2022-04-13 DIAGNOSIS — N39.46 URINARY INCONTINENCE, MIXED: ICD-10-CM

## 2022-04-13 DIAGNOSIS — N32.81 OVERACTIVE BLADDER: ICD-10-CM

## 2022-04-13 PROCEDURE — 99396 PREV VISIT EST AGE 40-64: CPT | Performed by: OBSTETRICS & GYNECOLOGY

## 2022-04-13 RX ORDER — ESTRADIOL 0.5 MG/1
0.5 TABLET ORAL DAILY
Qty: 90 TABLET | Refills: 3 | Status: SHIPPED | OUTPATIENT
Start: 2022-04-13 | End: 2022-06-06

## 2022-04-13 NOTE — PROGRESS NOTES
Heather Marti is a , 64 y.o. female   No LMP recorded (lmp unknown). Patient is postmenopausal.    She presents for her annual    She is having no significant problems. Menstrual status:  Cycles are menopausal.    Flow: absent. She does not have dysmenorrhea. Medical conditions:  Since her last annual GYN exam about one year ago, she has not the following changes in her health history: none. Mammogram History:    [facility] Results (most recent):  Results from Appointment encounter on 22    [facility] 3D MANDEEP W MAMMO BI SCREENING INCL CAD    Narrative  Screening mammogram.    HISTORY: No family or personal history of breast cancer, asymptomatic, for  screening. COMPARISON: 2020, 3/4/2019, 2017, 2017 mammograms. 2-D and 3-D images are obtained. The breasts are heterogeneously dense which lowers the sensitivity of  mammography. A benign calcification is seen in each breast. There are multiple  biopsy site markers in the left breast. No pathologic masses, calcifications, or  other suspicious findings are seen. No change from prior. Impression  No evidence of malignancy. BI-RADS 2: Benign findings. Follow-up: Annual screening mammography. Lifetime calculated breast cancer risk Margaretta Southampton model) is 7.03%. DEXA Results (most recent):  Results from Orders Only encounter on 21    DEXA BONE DENSITY STUDY AXIAL         Past Medical History:   Diagnosis Date    Anxiety     Cystocele with prolapse 2020    Menopause 2020    Osteopenia 2020    Smoker 2020     Past Surgical History:   Procedure Laterality Date    HX BREAST BIOPSY      X2 normal    HX BREAST BIOPSY      HX CHOLECYSTECTOMY      HX GI      cholecystectomy    HX HYSTERECTOMY  2021    With oophorectomy, robotic, cystocele repair.  HX PARTIAL HYSTERECTOMY  2021       Prior to Admission medications    Medication Sig Start Date End Date Taking?  Authorizing Provider estradioL (ESTRACE) 0.5 mg tablet Take 1 Tablet by mouth daily. 4/13/22  Yes Lina Freitas MD   clorazepate (TRANXENE) 3.75 mg tablet Take 1 Tablet by mouth three (3) times daily. Max Daily Amount: 11.25 mg. 2/16/22  Yes Elisha Robin MD   mirabegron ER (MYRBETRIQ) 25 mg ER tablet Take 25 mg by mouth daily. Yes Provider, Historical   latanoprost (XALATAN) 0.005 % ophthalmic solution INSTILL 1 DROP INTO BOTH EYES [facility] 8/24/18  Yes Provider, Historical       No Known Allergies       Tobacco History:  reports that she has been smoking cigarettes. She has been smoking about 0.50 packs per day. She has never used smokeless tobacco.  Alcohol use:  reports no history of alcohol use. Drug use:  reports no history of drug use. Family Medical/Cancer History:   Family History   Problem Relation Age of Onset    Other Mother         brain tumor    Diabetes Maternal Aunt           Review of Systems   Constitutional: Negative for chills, fever, malaise/fatigue and weight loss. HENT: Negative for congestion, ear pain, sinus pain and tinnitus. Eyes: Negative for blurred vision and double vision. Respiratory: Negative for cough, shortness of breath and wheezing. Cardiovascular: Negative for chest pain and palpitations. Gastrointestinal: Negative for abdominal pain, blood in stool, constipation, diarrhea, heartburn, nausea and vomiting. Genitourinary: Negative for dysuria, flank pain, frequency, hematuria and urgency. Musculoskeletal: Negative for joint pain and myalgias. Skin: Negative for itching and rash. Neurological: Negative for dizziness, weakness and headaches. Psychiatric/Behavioral: Negative for depression, memory loss and suicidal ideas. The patient is not nervous/anxious and does not have insomnia. Physical Exam  Constitutional:       Appearance: Normal appearance. HENT:      Head: Normocephalic and atraumatic. Cardiovascular:      Rate and Rhythm: Normal rate.       Heart sounds: Normal heart sounds. Pulmonary:      Effort: Pulmonary effort is normal.      Breath sounds: Normal breath sounds. Chest:   Breasts:      Right: Normal.      Left: Normal.       Abdominal:      General: Abdomen is flat. Palpations: Abdomen is soft. Genitourinary:     General: Normal vulva. Vagina: Normal.      Cervix: Normal.      Uterus: Normal.       Adnexa: Right adnexa normal and left adnexa normal.      Rectum: Normal.      Comments: PAP Obtained  Neurological:      Mental Status: She is alert. Psychiatric:         Mood and Affect: Mood normal.         Behavior: Behavior normal.         Thought Content: Thought content normal.          Visit Vitals  /68 (BP 1 Location: Left upper arm, BP Patient Position: Sitting, BP Cuff Size: Small adult)   Ht 5' 2.5\" (1.588 m)   Wt 146 lb 2 oz (66.3 kg)   LMP  (LMP Unknown)   BMI 26.30 kg/m²         Assessment:   Diagnoses and all orders for this visit:    1. Screening for malignant neoplasm of cervix  -     PAP IG, RFX APTIMA HPV ASCUS ([id number])    2. Routine gynecological examination  -     PAP IG, RFX APTIMA HPV ASCUS ([id number])    3. Menopausal syndrome    4. Symptomatic menopausal or female climacteric states  -     estradioL (ESTRACE) 0.5 mg tablet; Take 1 Tablet by mouth daily. 5. Urinary incontinence, mixed    6. Overactive bladder    Seen at [facility]- gets medicine from there for OB    Plan: Questions addressed  Counseled re: diet, exercise, healthy lifestyle  Return for Annual  Rec annual mammogram        Follow-up and Dispositions    · Return for 1 yr annual, 1 yr mammo.

## 2022-04-13 NOTE — PROGRESS NOTES
Chief Complaint   Patient presents with    Annual Exam     Visit Vitals  /68 (BP 1 Location: Left upper arm, BP Patient Position: Sitting, BP Cuff Size: Small adult)   Ht 5' 2.5\" (1.588 m)   Wt 146 lb 2 oz (66.3 kg)   LMP  (LMP Unknown)   BMI 26.30 kg/m²

## 2022-04-16 LAB
CYTOLOGIST CVX/VAG CYTO: NORMAL
CYTOLOGY CVX/VAG DOC CYTO: NORMAL
CYTOLOGY CVX/VAG DOC THIN PREP: NORMAL
DX ICD CODE: NORMAL
LABCORP, 190119: NORMAL
Lab: NORMAL
OTHER STN SPEC: NORMAL
STAT OF ADQ CVX/VAG CYTO-IMP: NORMAL

## 2022-05-06 ENCOUNTER — VIRTUAL VISIT (OUTPATIENT)
Dept: FAMILY MEDICINE CLINIC | Age: 62
End: 2022-05-06
Payer: COMMERCIAL

## 2022-05-06 DIAGNOSIS — F41.9 ANXIETY: Primary | ICD-10-CM

## 2022-05-06 PROCEDURE — 99442 PR PHYS/QHP TELEPHONE EVALUATION 11-20 MIN: CPT | Performed by: NURSE PRACTITIONER

## 2022-05-06 RX ORDER — CLORAZEPATE DIPOTASSIUM 3.75 MG/1
3.75 TABLET ORAL 3 TIMES DAILY
Qty: 90 TABLET | Refills: 2 | Status: SHIPPED | OUTPATIENT
Start: 2022-05-20 | End: 2022-07-21 | Stop reason: SDUPTHER

## 2022-06-04 DIAGNOSIS — N95.1 SYMPTOMATIC MENOPAUSAL OR FEMALE CLIMACTERIC STATES: ICD-10-CM

## 2022-06-06 RX ORDER — ESTRADIOL 0.5 MG/1
0.5 TABLET ORAL DAILY
Qty: 90 TABLET | Refills: 3 | Status: SHIPPED | OUTPATIENT
Start: 2022-06-06

## 2022-07-21 DIAGNOSIS — F41.9 ANXIETY: ICD-10-CM

## 2022-07-21 RX ORDER — CLORAZEPATE DIPOTASSIUM 3.75 MG/1
3.75 TABLET ORAL 3 TIMES DAILY
Qty: 90 TABLET | Refills: 1 | Status: SHIPPED | OUTPATIENT
Start: 2022-07-21 | End: 2022-08-31 | Stop reason: SDUPTHER

## 2022-07-21 NOTE — TELEPHONE ENCOUNTER
Please advise!!    .PCP: Renata Deluna MD    Last appt: 5/6/2022  No future appointments.     Requested Prescriptions      No prescriptions requested or ordered in this encounter       Prior labs and Blood pressures:  BP Readings from Last 3 Encounters:   04/13/22 112/68   12/07/20 114/68   01/08/20 112/64     Lab Results   Component Value Date/Time    Sodium 141 11/07/2018 09:51 AM    Potassium 4.6 11/07/2018 09:51 AM    Chloride 101 11/07/2018 09:51 AM    CO2 27 11/07/2018 09:51 AM    Glucose 93 11/07/2018 09:51 AM    BUN 11 11/07/2018 09:51 AM    Creatinine 0.80 11/07/2018 09:51 AM    BUN/Creatinine ratio 14 11/07/2018 09:51 AM    GFR est AA 95 11/07/2018 09:51 AM    GFR est non-AA 82 11/07/2018 09:51 AM    Calcium 9.1 11/07/2018 09:51 AM     Lab Results   Component Value Date/Time    Hemoglobin A1c 5.4 11/07/2018 09:51 AM     Lab Results   Component Value Date/Time    Cholesterol, total 148 11/07/2018 09:51 AM    HDL Cholesterol 57 11/07/2018 09:51 AM    LDL, calculated 76 11/07/2018 09:51 AM    VLDL, calculated 15 11/07/2018 09:51 AM    Triglyceride 76 11/07/2018 09:51 AM     No results found for: DAVIS Garcia    Lab Results   Component Value Date/Time    TSH 1.090 11/07/2018 09:51 AM

## 2022-08-24 DIAGNOSIS — Z00.00 ANNUAL PHYSICAL EXAM: Primary | ICD-10-CM

## 2022-08-26 LAB
ALBUMIN SERPL-MCNC: 4.6 G/DL (ref 3.8–4.8)
ALBUMIN/CREAT UR: 6 MG/G CREAT (ref 0–29)
ALP SERPL-CCNC: 99 IU/L (ref 44–121)
ALT SERPL-CCNC: 11 IU/L (ref 0–32)
AST SERPL-CCNC: 15 IU/L (ref 0–40)
BASOPHILS # BLD AUTO: 0 X10E3/UL (ref 0–0.2)
BASOPHILS NFR BLD AUTO: 1 %
BILIRUB DIRECT SERPL-MCNC: 0.1 MG/DL (ref 0–0.4)
BILIRUB SERPL-MCNC: 0.4 MG/DL (ref 0–1.2)
BUN SERPL-MCNC: 11 MG/DL (ref 8–27)
BUN/CREAT SERPL: 11 (ref 12–28)
CALCIUM SERPL-MCNC: 9.4 MG/DL (ref 8.7–10.3)
CHLORIDE SERPL-SCNC: 102 MMOL/L (ref 96–106)
CHOLEST SERPL-MCNC: 168 MG/DL (ref 100–199)
CO2 SERPL-SCNC: 24 MMOL/L (ref 20–29)
CREAT SERPL-MCNC: 1.04 MG/DL (ref 0.57–1)
CREAT UR-MCNC: 180.2 MG/DL
EGFR: 61 ML/MIN/1.73
EOSINOPHIL # BLD AUTO: 0.1 X10E3/UL (ref 0–0.4)
EOSINOPHIL NFR BLD AUTO: 1 %
ERYTHROCYTE [DISTWIDTH] IN BLOOD BY AUTOMATED COUNT: 12.6 % (ref 11.7–15.4)
EST. AVERAGE GLUCOSE BLD GHB EST-MCNC: 117 MG/DL
GLUCOSE SERPL-MCNC: 102 MG/DL (ref 65–99)
HBA1C MFR BLD: 5.7 % (ref 4.8–5.6)
HCT VFR BLD AUTO: 39.7 % (ref 34–46.6)
HDLC SERPL-MCNC: 68 MG/DL
HGB BLD-MCNC: 13.2 G/DL (ref 11.1–15.9)
IMM GRANULOCYTES # BLD AUTO: 0 X10E3/UL (ref 0–0.1)
IMM GRANULOCYTES NFR BLD AUTO: 0 %
LDLC SERPL CALC-MCNC: 87 MG/DL (ref 0–99)
LYMPHOCYTES # BLD AUTO: 2.1 X10E3/UL (ref 0.7–3.1)
LYMPHOCYTES NFR BLD AUTO: 36 %
MCH RBC QN AUTO: 32 PG (ref 26.6–33)
MCHC RBC AUTO-ENTMCNC: 33.2 G/DL (ref 31.5–35.7)
MCV RBC AUTO: 96 FL (ref 79–97)
MICROALBUMIN UR-MCNC: 10.2 UG/ML
MONOCYTES # BLD AUTO: 0.4 X10E3/UL (ref 0.1–0.9)
MONOCYTES NFR BLD AUTO: 6 %
NEUTROPHILS # BLD AUTO: 3.2 X10E3/UL (ref 1.4–7)
NEUTROPHILS NFR BLD AUTO: 56 %
PLATELET # BLD AUTO: 183 X10E3/UL (ref 150–450)
POTASSIUM SERPL-SCNC: 4.4 MMOL/L (ref 3.5–5.2)
PROT SERPL-MCNC: 7.1 G/DL (ref 6–8.5)
RBC # BLD AUTO: 4.12 X10E6/UL (ref 3.77–5.28)
SODIUM SERPL-SCNC: 141 MMOL/L (ref 134–144)
TRIGL SERPL-MCNC: 68 MG/DL (ref 0–149)
TSH SERPL DL<=0.005 MIU/L-ACNC: 1.63 UIU/ML (ref 0.45–4.5)
VLDLC SERPL CALC-MCNC: 13 MG/DL (ref 5–40)
WBC # BLD AUTO: 5.8 X10E3/UL (ref 3.4–10.8)

## 2022-08-31 ENCOUNTER — OFFICE VISIT (OUTPATIENT)
Dept: FAMILY MEDICINE CLINIC | Age: 62
End: 2022-08-31
Payer: COMMERCIAL

## 2022-08-31 VITALS
DIASTOLIC BLOOD PRESSURE: 72 MMHG | HEART RATE: 85 BPM | BODY MASS INDEX: 25.09 KG/M2 | WEIGHT: 141.6 LBS | SYSTOLIC BLOOD PRESSURE: 117 MMHG | RESPIRATION RATE: 17 BRPM | OXYGEN SATURATION: 99 % | TEMPERATURE: 98.1 F | HEIGHT: 63 IN

## 2022-08-31 DIAGNOSIS — F41.9 ANXIETY: ICD-10-CM

## 2022-08-31 DIAGNOSIS — Z12.2 SCREENING FOR LUNG CANCER: ICD-10-CM

## 2022-08-31 DIAGNOSIS — Z12.11 SCREENING FOR COLON CANCER: ICD-10-CM

## 2022-08-31 DIAGNOSIS — Z00.00 ANNUAL PHYSICAL EXAM: Primary | ICD-10-CM

## 2022-08-31 DIAGNOSIS — F17.200 SMOKER: ICD-10-CM

## 2022-08-31 PROCEDURE — 99396 PREV VISIT EST AGE 40-64: CPT | Performed by: FAMILY MEDICINE

## 2022-08-31 PROCEDURE — 99213 OFFICE O/P EST LOW 20 MIN: CPT | Performed by: FAMILY MEDICINE

## 2022-08-31 RX ORDER — TOLTERODINE 2 MG/1
2 CAPSULE, EXTENDED RELEASE ORAL DAILY
COMMUNITY

## 2022-08-31 RX ORDER — CLORAZEPATE DIPOTASSIUM 3.75 MG/1
3.75 TABLET ORAL 3 TIMES DAILY
Qty: 90 TABLET | Refills: 2 | Status: SHIPPED | OUTPATIENT
Start: 2022-09-22

## 2022-08-31 NOTE — PROGRESS NOTES
Chief Complaint   Patient presents with    Physical     1. Have you been to the ER, urgent care clinic since your last visit? Hospitalized since your last visit? No    2. Have you seen or consulted any other health care providers outside of the [address] [place] [address] since your last visit? Include any pap smears or colon screening.  No

## 2022-08-31 NOTE — PROGRESS NOTES
Nadege Billingsley  64 y.o. female  1960  MAC:[id number]  [facility]  Progress Note     Assessment and Plan:    1. Annual physical exam  Updated  Stop smoking  See dentist and eye doctor    2. Screening for colon cancer  To Dr. Keri Ditto - [facility]    3. Smoker  Stop smoking    4. Screening for lung cancer  Smoking since age 21  - CT LOW DOSE LUNG CANCER SCREENING; Future    5. Anxiety  Refilled   OK  NO other scheduled meds  - clorazepate (TRANXENE) 3.75 mg tablet; Take 1 Tablet by mouth three (3) times daily. Max Daily Amount: 11.25 mg. Indications: anxious  Dispense: 90 Tablet; Refill: 2    Diet, exercise and safety discussed with patient. Diagnoses and plans were discussed with the patient. After visit summary given to the patient as well. Follow-up and Dispositions    Return in about 3 months (around 11/30/2022) for Medication follow up.          Noreen Cordon MD    Nadege Billingsley is a 64 y.o. female who had concerns including Physical.    Health maintenance:    Remains on tolterodine and estrogen  Urinary incontinence is better    Remains on Tranxene, needs refill   OK  NO side effects  Still feels she needs meds  Home and job stress    Immunizations:    Influenza: n/a   Tetanus: up to date   Shingles: will defer to next year   Pneumovacc 23: not indicated    COVID vaccine: interview    Cancer screening status   Colonoscopy: guidelines reviewed: will order today   PAP: guidelines reviewed: up to date   Mammogram: guidelines reviewed: up to date   Lung CT: guidelines reviewed: will order today    Bone density screening: will do at age 72    Smoking: currently smokes cigarettes      ETOH: modest use-less than 2 drinks per night/once a week    Drugs: Past drug use    Sexual history: Monogamous heterosexual    Eye exam and Glaucoma Screening: advised to have exam    Last dental exam: overdue-advised to see dentist    The following sections were reviewed & updated as appropriate: PMH, PSH, FH, and . Patient Active Problem List   Diagnosis Code    Anxiety F41.9    Cystocele with prolapse N81.4    Menopause Z78.0    Smoker F17.200    Osteopenia M85.80    Urinary incontinence, mixed N39.46    Overactive bladder N32.81          Prior to Admission medications    Medication Sig Start Date End Date Taking? Authorizing Provider   tolterodine ER (DETROL-LA) 2 mg ER capsule Take 2 mg by mouth daily. Yes Provider, Historical   clorazepate (TRANXENE) 3.75 mg tablet Take 1 Tablet by mouth three (3) times daily. Max Daily Amount: 11.25 mg. Indications: anxious 9/22/22  Yes Satish Barnes MD   clorazepate (TRANXENE) 3.75 mg tablet Take 1 Tablet by mouth three (3) times daily. Max Daily Amount: 11.25 mg. Indications: anxious 7/21/22 8/31/22  Satish Barnes MD   estradioL (ESTRACE) 0.5 mg tablet TAKE 1 TABLET BY MOUTH DAILY 6/6/22   Cesar Ramos MD   mirabegron ER [facility]) 25 mg ER tablet Take 25 mg by mouth daily. 8/31/22  Provider, Historical   latanoprost (XALATAN) 0.005 % ophthalmic solution INSTILL 1 DROP INTO BOTH EYES [facility] 8/24/18   Provider, Historical          No Known Allergies      Smoker:   Social History     Tobacco Use   Smoking Status Some Days    Packs/day: 0.50    Types: Cigarettes   Smokeless Tobacco Never     ETOH:   Social History     Substance and Sexual Activity   Alcohol Use No       FH:    Family History   Problem Relation Age of Onset    Other Mother         brain tumor    Diabetes Maternal Aunt        Review of Systems   Constitutional:  Positive for weight loss. Respiratory:  Negative for shortness of breath. Cardiovascular:  Negative for chest pain. Gastrointestinal:  Negative for blood in stool. Genitourinary:  Negative for hematuria. Psychiatric/Behavioral:  The patient is nervous/anxious.         Physical Exam:  Visit Vitals  /72 (BP 1 Location: Left upper arm, BP Patient Position: Sitting, BP Cuff Size: Adult)   Pulse 85   Temp 98.1 °F (36.7 °C) (Temporal)   Resp 17   Ht 5' 2.5\" (1.588 m)   Wt 141 lb 9.6 oz (64.2 kg)   LMP  (LMP Unknown)   SpO2 99%   BMI 25.49 kg/m²       Physical Examination:  Physical Exam  General appearance - alert, well appearing, and in no distress, oriented to person, place, and time and normal appearing weight  Mental status -  normal mood, behavior, speech, dress, motor activity, and thought processes, no expressed suicidal or homicidal ideation, no hallucinations  Ears - bilateral TM's and external ear canals normal  Nose - normal and patent, no erythema, discharge or polyps  Mouth - mucous membranes moist, pharynx normal without lesions  Neck - supple, no significant adenopathy  Breast-sees GYN  Chest - normal chest excursion, normal inspiratory and expiratory function. Clear to ausculation bilaterally. Heart - normal rate, regular rhythm, normal S1, S2, no murmurs, rubs, clicks or gallops, no extremity edema  Abdomen- benign, no organomegaly or masses  GYN-sees GYN  Skin-no rashes or suspicious moles  Neuro normal speech, moves all extremities, normal gait  Musculoskeletal- grossly normal joint and motor function.

## 2022-09-10 ENCOUNTER — HOSPITAL ENCOUNTER (OUTPATIENT)
Dept: CT IMAGING | Age: 62
Discharge: HOME | End: 2022-09-10
Payer: COMMERCIAL

## 2022-09-10 VITALS — HEIGHT: 63 IN | WEIGHT: 142 LBS | BODY MASS INDEX: 25.16 KG/M2

## 2022-09-10 DIAGNOSIS — Z12.2 SCREENING FOR LUNG CANCER: ICD-10-CM

## 2022-09-10 PROCEDURE — 71271 CT THORAX LUNG CANCER SCR C-: CPT

## 2022-12-06 ENCOUNTER — VIRTUAL VISIT (OUTPATIENT)
Dept: FAMILY MEDICINE CLINIC | Age: 62
End: 2022-12-06
Payer: COMMERCIAL

## 2022-12-06 DIAGNOSIS — F41.9 ANXIETY: Primary | ICD-10-CM

## 2022-12-06 PROCEDURE — 99442 PR PHYS/QHP TELEPHONE EVALUATION 11-20 MIN: CPT | Performed by: NURSE PRACTITIONER

## 2022-12-06 RX ORDER — CLORAZEPATE DIPOTASSIUM 3.75 MG/1
3.75 TABLET ORAL 3 TIMES DAILY
Qty: 90 TABLET | Refills: 0 | Status: SHIPPED | OUTPATIENT
Start: 2022-12-22 | End: 2023-01-21

## 2022-12-06 NOTE — PROGRESS NOTES
Emma Murray (: 1960) is a 64 y.o. female, established patient, here for evaluation of the following chief complaint(s):   Medication Refill and Anxiety       ASSESSMENT/PLAN:  Below is the assessment and plan developed based on review of pertinent history, labs, studies, and medications. 1. Anxiety  -     clorazepate (TRANXENE) 3.75 mg tablet; Take 1 Tablet by mouth three (3) times daily for 30 days. Max Daily Amount: 11.25 mg. Indications: anxious, Normal, Disp-90 Tablet, R-0  Patient was seen by telephone encounter for medication refill. Reports taking medication 3 times a day as prescribed for anxiety. Patient's current prescription does not  till [phone] so this new medication will not be available until that date. 30 days have been given. Patient will need to do follow-up with Dr. Marga Villa for continued controlled substance prescription. P DMP was run with no indication for misuse or abuse. Return in about 4 weeks (around 1/3/2023). SUBJECTIVE/OBJECTIVE:  HPI    Review of Systems   Constitutional: Negative. HENT: Negative. Respiratory: Negative. Cardiovascular: Negative. Genitourinary: Negative. Musculoskeletal: Negative. Neurological: Negative. Psychiatric/Behavioral:  The patient is nervous/anxious. No data recorded     Physical Exam            Emma Murray, was evaluated through a synchronous (real-time) audio-video encounter. The patient (or guardian if applicable) is aware that this is a billable service, which includes applicable co-pays. This Virtual Visit was conducted with patient's (and/or legal guardian's) consent. The visit was conducted pursuant to the emergency declaration under the [facility], [address] [place] [address] authority and the [employer] General Act. Patient identification was verified, and a caregiver was present when appropriate.   The patient was located at: Home: [address] [place] [id number]  The provider was located at: Facility (Appt Department): [address] [address]       An electronic signature was used to authenticate this note.   -- Calvin Ortez, NP

## 2023-01-06 ENCOUNTER — VIRTUAL VISIT (OUTPATIENT)
Dept: FAMILY MEDICINE CLINIC | Age: 63
End: 2023-01-06
Payer: COMMERCIAL

## 2023-01-06 DIAGNOSIS — F41.9 ANXIETY: Primary | ICD-10-CM

## 2023-01-06 DIAGNOSIS — Z12.11 SCREENING FOR COLON CANCER: ICD-10-CM

## 2023-01-06 PROCEDURE — 99213 OFFICE O/P EST LOW 20 MIN: CPT | Performed by: FAMILY MEDICINE

## 2023-01-06 RX ORDER — CLORAZEPATE DIPOTASSIUM 3.75 MG/1
3.75 TABLET ORAL 3 TIMES DAILY
Qty: 90 TABLET | Refills: 3 | Status: SHIPPED | OUTPATIENT
Start: 2023-01-06 | End: 2023-05-06

## 2023-01-06 RX ORDER — TOLTERODINE TARTRATE 2 MG/1
2 TABLET, EXTENDED RELEASE ORAL 2 TIMES DAILY
COMMUNITY
Start: 2022-12-15

## 2023-01-06 NOTE — PROGRESS NOTES
VV-Pt is aware of billable appt depending on insurance plan. Preferred number [phone]    Pt verbally agrees to labs, orders, and others to be mailed to confirmed home address. Identified pt with two pt identifiers(name and ). Reviewed record in preparation for visit and have obtained necessary documentation. All patient medications has been reviewed. Chief Complaint   Patient presents with    Follow-up    Medication Refill     clorazepate is due later this month           Health Maintenance Review: Patient reminded of \"due or due soon\" health maintenance. I have asked the patient to contact his/her primary care provider (PCP) for follow-up on his/her health maintenance. Patient-Reported Vitals 2023   Patient-Reported Weight 142lbs   Patient-Reported Height -        Wt Readings from Last 3 Encounters:   09/10/22 142 lb (64.4 kg)   22 141 lb 9.6 oz (64.2 kg)   22 146 lb 2 oz (66.3 kg)     Temp Readings from Last 3 Encounters:   22 98.1 °F (36.7 °C) (Temporal)   20 98.4 °F (36.9 °C) (Oral)   10/28/19 98.5 °F (36.9 °C) (Oral)     BP Readings from Last 3 Encounters:   22 117/72   22 112/68   20 114/68     Pulse Readings from Last 3 Encounters:   22 85   20 83   10/28/19 87         Coordination of Care Questionnaire:   1) Have you been to an emergency room, urgent care, or hospitalized since your last visit? Yes seen at [facility] for a sinus infection 5 weeks ago.     2. Have seen or consulted any other health care provider since your last visit? no

## 2023-01-06 NOTE — PROGRESS NOTES
Nellie Snellen is a 58 y.o. female evaluated via telephone on 1/6/2023. Consent:  She and/or health care decision maker is aware that she may receive a bill for this telephone service, depending on her insurance coverage, and has provided verbal consent to proceed: Yes      Documentation:  I communicated with the patient and/or health care decision maker about Med refill. Details of this discussion including any medical advice provided:     Recent sinus infection  Seen at [facility].    Remains on same meds  Wonders if she still needs estrogen  Remains on same benzo. Needs refill  Doing OK emotionally. Up to date mammogram.  Colonoscopy-interested. Assessment and Plan:    1. Anxiety  Refilled  Had to call back after just one month due to my absence so gave 3 refills this time. Chronic benzo use for anxiety   checked. No other scheduled meds  No signs of misuse or diversion  - clorazepate (TRANXENE) 3.75 mg tablet; Take 1 Tablet by mouth three (3) times daily for 120 days. Max Daily Amount: 11.25 mg. Indications: anxious  Dispense: 90 Tablet; Refill: 3    2. Screening for colon cancer  Never has had screening  TO Dr. Nacho Mix.  - Audery Cord and Dispositions    Return in about 4 months (around 5/6/2023) for Medication follow up. I affirm this is a Patient Initiated Episode with a Patient who has not had a related appointment within my department in the past 7 days or scheduled within the next 24 hours. Total Time: minutes: 21-30 minutes    Note: not billable if this call serves to triage the patient into an appointment for the relevant concern    The patient was at home and I was in office for this phone visit.     Ginette Costello MD

## 2023-03-29 DIAGNOSIS — F41.9 ANXIETY: ICD-10-CM

## 2023-03-29 RX ORDER — CLORAZEPATE DIPOTASSIUM 3.75 MG/1
3.75 TABLET ORAL 3 TIMES DAILY
Qty: 90 TABLET | Refills: 2 | Status: SHIPPED | OUTPATIENT
Start: 2023-03-29 | End: 2023-07-27

## 2023-03-29 NOTE — TELEPHONE ENCOUNTER
Pt states that the last Pharmacy that this medication got sent to did not offer the dosage in 3.75 but the [facility] on [address] does offer that dosage. PCP: Deric Leung MD    Last appt: 1/6/2023  No future appointments. Requested Prescriptions     Pending Prescriptions Disp Refills    clorazepate (TRANXENE) 3.75 mg tablet 90 Tablet 3     Sig: Take 1 Tablet by mouth three (3) times daily for 120 days. Max Daily Amount: 11.25 mg.  Indications: anxious       Prior labs and Blood pressures:  BP Readings from Last 3 Encounters:   08/31/22 117/72   04/13/22 112/68   12/07/20 114/68     Lab Results   Component Value Date/Time    Sodium 141 08/25/2022 09:17 AM    Potassium 4.4 08/25/2022 09:17 AM    Chloride 102 08/25/2022 09:17 AM    CO2 24 08/25/2022 09:17 AM    Glucose 102 (H) 08/25/2022 09:17 AM    BUN 11 08/25/2022 09:17 AM    Creatinine 1.04 (H) 08/25/2022 09:17 AM    BUN/Creatinine ratio 11 (L) 08/25/2022 09:17 AM    GFR est AA 95 11/07/2018 09:51 AM    GFR est non-AA 82 11/07/2018 09:51 AM    Calcium 9.4 08/25/2022 09:17 AM     Lab Results   Component Value Date/Time    Hemoglobin A1c 5.7 (H) 08/25/2022 09:17 AM     Lab Results   Component Value Date/Time    Cholesterol, total 168 08/25/2022 09:17 AM    HDL Cholesterol 68 08/25/2022 09:17 AM    LDL, calculated 87 08/25/2022 09:17 AM    LDL, calculated 76 11/07/2018 09:51 AM    VLDL, calculated 13 08/25/2022 09:17 AM    VLDL, calculated 15 11/07/2018 09:51 AM    Triglyceride 68 08/25/2022 09:17 AM     No results found for: Sarah Farooq [id number]    Lab Results   Component Value Date/Time    TSH 1.630 08/25/2022 09:17 AM

## 2023-05-11 RX ORDER — CLORAZEPATE DIPOTASSIUM 3.75 MG/1
TABLET ORAL
COMMUNITY
Start: 2023-04-26 | End: 2023-05-12 | Stop reason: ALTCHOICE

## 2023-05-11 RX ORDER — CLORAZEPATE DIPOTASSIUM 3.75 MG/1
TABLET ORAL
OUTPATIENT
Start: 2023-05-11

## 2023-05-11 RX ORDER — TOLTERODINE TARTRATE 2 MG/1
2 TABLET, EXTENDED RELEASE ORAL 2 TIMES DAILY
COMMUNITY
Start: 2023-04-17

## 2023-05-11 RX ORDER — LATANOPROST 50 UG/ML
SOLUTION/ DROPS OPHTHALMIC
COMMUNITY
Start: 2023-02-07

## 2023-05-11 RX ORDER — ESTRADIOL 0.5 MG/1
0.5 TABLET ORAL DAILY
COMMUNITY
Start: 2023-03-19 | End: 2023-07-06 | Stop reason: SDUPTHER

## 2023-05-12 ENCOUNTER — TELEMEDICINE (OUTPATIENT)
Facility: CLINIC | Age: 63
End: 2023-05-12
Payer: COMMERCIAL

## 2023-05-12 DIAGNOSIS — F41.1 GENERALIZED ANXIETY DISORDER: Primary | ICD-10-CM

## 2023-05-12 DIAGNOSIS — Z12.11 SCREENING FOR COLON CANCER: ICD-10-CM

## 2023-05-12 PROCEDURE — 99442 PR PHYS/QHP TELEPHONE EVALUATION 11-20 MIN: CPT | Performed by: FAMILY MEDICINE

## 2023-05-12 RX ORDER — LORAZEPAM 0.5 MG/1
0.5 TABLET ORAL 2 TIMES DAILY
Qty: 60 TABLET | Refills: 2 | Status: SHIPPED | OUTPATIENT
Start: 2023-05-12 | End: 2023-08-10

## 2023-05-12 ASSESSMENT — PATIENT HEALTH QUESTIONNAIRE - PHQ9
SUM OF ALL RESPONSES TO PHQ QUESTIONS 1-9: 0
SUM OF ALL RESPONSES TO PHQ QUESTIONS 1-9: 0
1. LITTLE INTEREST OR PLEASURE IN DOING THINGS: 0
SUM OF ALL RESPONSES TO PHQ9 QUESTIONS 1 & 2: 0
SUM OF ALL RESPONSES TO PHQ QUESTIONS 1-9: 0
2. FEELING DOWN, DEPRESSED OR HOPELESS: 0
SUM OF ALL RESPONSES TO PHQ QUESTIONS 1-9: 0

## 2023-05-12 NOTE — PROGRESS NOTES
1. Have you been to the ER, urgent care clinic since your last visit? Hospitalized since your last visit? No    2. Have you seen or consulted any other health care providers outside of the [address] [place] [address] since your last visit? Include any pap smears or colon screening.  No      Chief Complaint   Patient presents with    Medication Refill     Clorazepate 3.75 mg

## 2023-05-12 NOTE — PROGRESS NOTES
Farzad Cooper is a 58 y.o. female evaluated via telephone on 5/12/2023. Consent:  She and/or health care decision maker is aware that she may receive a bill for this telephone service, depending on her insurance coverage, and has provided verbal consent to proceed: Home independently      Documentation:  I communicated with the patient and/or health care decision maker about   Pharmacy tells patient that they can no longer get her the lower size 3.75 mg size Tranxene  . Details of this discussion including any medical advice provided:     Pharmacy can only supply the next size up which is double the dose  No real change in how she is doing   is OK    Assessment and Plan:    1. Generalized anxiety disorder  We discussed switch to lorazepam since Tranxene is going to be come more and more difficult to find  She agrees to a change of meds  -     LORazepam (ATIVAN) 0.5 MG tablet; Take 1 tablet by mouth 2 times daily for 90 days. Replaces chlorazepate Max Daily Amount: 1 mg, Disp-60 tablet, R-2Normal    2. Screening for colon cancer  -     Mir Swain MD, Gastroenterology, [facility] )      FU 3 months   Call if problems with transition of meds. I affirmthis is a Patient Initiated Episode with a Patient who has not had a related appointment within my department in the past 7 days or scheduled within the next 24 hours. Total Time:  20    Note: not billable if this call serves to triage the patient into an appointment for the relevant concern    The patient was at home and I was in office for this phone visit.     Kai Farias MD

## 2023-05-26 ENCOUNTER — TELEPHONE (OUTPATIENT)
Facility: CLINIC | Age: 63
End: 2023-05-26

## 2023-06-22 VITALS — BODY MASS INDEX: 25.56 KG/M2 | HEIGHT: 63 IN

## 2023-07-06 ENCOUNTER — OFFICE VISIT (OUTPATIENT)
Age: 63
End: 2023-07-06
Payer: COMMERCIAL

## 2023-07-06 VITALS
DIASTOLIC BLOOD PRESSURE: 68 MMHG | HEIGHT: 63 IN | SYSTOLIC BLOOD PRESSURE: 112 MMHG | WEIGHT: 139.5 LBS | BODY MASS INDEX: 24.72 KG/M2

## 2023-07-06 DIAGNOSIS — Z12.31 SCREENING MAMMOGRAM FOR BREAST CANCER: ICD-10-CM

## 2023-07-06 DIAGNOSIS — Z01.419 SMEAR, VAGINAL, AS PART OF ROUTINE GYNECOLOGICAL EXAMINATION: ICD-10-CM

## 2023-07-06 DIAGNOSIS — N39.46 URINARY INCONTINENCE, MIXED: ICD-10-CM

## 2023-07-06 DIAGNOSIS — Z12.72 SMEAR, VAGINAL, AS PART OF ROUTINE GYNECOLOGICAL EXAMINATION: ICD-10-CM

## 2023-07-06 DIAGNOSIS — Z12.72 SCREENING FOR VAGINAL CANCER: ICD-10-CM

## 2023-07-06 DIAGNOSIS — N95.9 MENOPAUSAL PROBLEM: Primary | ICD-10-CM

## 2023-07-06 PROCEDURE — 99396 PREV VISIT EST AGE 40-64: CPT | Performed by: OBSTETRICS & GYNECOLOGY

## 2023-07-06 RX ORDER — ESTRADIOL 0.5 MG/1
0.5 TABLET ORAL DAILY
Qty: 90 TABLET | Refills: 3 | Status: SHIPPED | OUTPATIENT
Start: 2023-07-06

## 2023-07-06 ASSESSMENT — ENCOUNTER SYMPTOMS
RESPIRATORY NEGATIVE: 1
GASTROINTESTINAL NEGATIVE: 1

## 2023-07-06 NOTE — PROGRESS NOTES
Tita Ibarra is a No obstetric history on file. , 58 y.o. female   No LMP recorded. Patient has had a hysterectomy. She presents for her annual    She is having no significant problems. Menstrual status:  Cycles are menopausal.    Flow: absent. She does not have dysmenorrhea. Medical conditions:  Since her last annual GYN exam about one year ago, she has not the following changes in her health history: none. Mammogram History:    [id number]     [facility]  [id number]       Past Medical History:   Diagnosis Date    Anxiety     Cystocele with prolapse 8/30/2020    Menopause 8/30/2020    Osteopenia 8/30/2020    Smoker 8/30/2020     Past Surgical History:   Procedure Laterality Date    BREAST BIOPSY      BREAST BIOPSY      X2 normal    CHOLECYSTECTOMY      GI      cholecystectomy    HYSTERECTOMY (CERVIX STATUS UNKNOWN)  03/2021    With oophorectomy, robotic, cystocele repair. PARTIAL HYSTERECTOMY (CERVIX NOT REMOVED)  03/24/2021       Prior to Admission medications    Medication Sig Start Date End Date Taking? Authorizing Provider   estradiol (ESTRACE) 0.5 MG tablet Take 1 tablet by mouth daily 7/6/23  Yes Didi Linda MD   clorazepate (TRANXENE) 3.75 MG tablet Take 1 tablet by mouth 3 times daily for 120 days. Max Daily Amount: 11.25 mg 6/15/23 10/13/23 Yes Fabiola Bhardwaj MD   latanoprost (XALATAN) 0.005 % ophthalmic solution INSTILL 1 DROP IN BOTH EYES EVERY DAY IN THE MORNING 2/7/23  Yes Historical Provider, MD   tolterodine (DETROL) 2 MG tablet Take 1 tablet by mouth 2 times daily 4/17/23  Yes Historical Provider, MD       No Known Allergies       Tobacco History:  reports that she has been smoking cigarettes. She started smoking about 41 years ago. She has been smoking an average of .5 packs per day. She has never used smokeless tobacco.  Alcohol use:  reports no history of alcohol use.   Drug use:

## 2023-07-11 ENCOUNTER — TELEPHONE (OUTPATIENT)
Age: 63
End: 2023-07-11

## 2023-07-11 LAB
., LABCORP: NORMAL
CYTOLOGIST CVX/VAG CYTO: NORMAL
CYTOLOGY CVX/VAG DOC CYTO: NORMAL
CYTOLOGY CVX/VAG DOC THIN PREP: NORMAL
DX ICD CODE: NORMAL
Lab: NORMAL
Lab: NORMAL
OTHER STN SPEC: NORMAL
STAT OF ADQ CVX/VAG CYTO-IMP: NORMAL

## 2023-07-11 NOTE — TELEPHONE ENCOUNTER
Returned the patient's call and she states she was asked by the  at [employer] to call our office and let them know the reason she is having the DEXA. Patient was given the ICD 10 code and advised it is for menopausal symptoms.

## 2023-08-17 ENCOUNTER — TELEPHONE (OUTPATIENT)
Age: 63
End: 2023-08-17

## 2023-08-17 NOTE — TELEPHONE ENCOUNTER
08/17/23 8:13AM R/T call to the patient as she left a  requesting her pap results---states she does not really use mychart and has not received them through the mail---- she requested to please give her a call @ [phone].

## 2023-08-17 NOTE — TELEPHONE ENCOUNTER
Returned the patient's call and advised her a normal pap letter was mailed to her on 07/11/23. Copy of the letter put in the mail to her again.

## 2023-08-25 DIAGNOSIS — Z12.31 SCREENING MAMMOGRAM FOR BREAST CANCER: ICD-10-CM

## 2023-09-08 DIAGNOSIS — N95.9 MENOPAUSAL PROBLEM: ICD-10-CM

## 2023-09-11 ENCOUNTER — TELEPHONE (OUTPATIENT)
Age: 63
End: 2023-09-11

## 2023-09-25 ENCOUNTER — TELEPHONE (OUTPATIENT)
Facility: CLINIC | Age: 63
End: 2023-09-25

## 2023-09-25 NOTE — TELEPHONE ENCOUNTER
Pt called stating she would like a call from a nurse regarding her aapt that was reschedule for meed refills on clorazepate. I asked what the reason is. She refused giving me any info.

## 2023-10-21 ENCOUNTER — HOSPITAL ENCOUNTER (OUTPATIENT)
Facility: HOSPITAL | Age: 63
End: 2023-10-21
Attending: FAMILY MEDICINE
Payer: COMMERCIAL

## 2023-10-21 DIAGNOSIS — F17.210 CIGARETTE NICOTINE DEPENDENCE WITHOUT COMPLICATION: ICD-10-CM

## 2023-10-21 PROCEDURE — 71271 CT THORAX LUNG CANCER SCR C-: CPT

## 2024-07-06 DIAGNOSIS — N95.9 MENOPAUSAL PROBLEM: ICD-10-CM

## 2024-07-08 RX ORDER — ESTRADIOL 0.5 MG/1
0.5 TABLET ORAL DAILY
Qty: 90 TABLET | Refills: 0 | Status: SHIPPED | OUTPATIENT
Start: 2024-07-08

## 2024-10-25 DIAGNOSIS — N95.9 MENOPAUSAL PROBLEM: ICD-10-CM

## 2024-10-25 RX ORDER — ESTRADIOL 0.5 MG/1
0.5 TABLET ORAL DAILY
Qty: 90 TABLET | Refills: 0 | OUTPATIENT
Start: 2024-10-25

## 2024-10-31 ENCOUNTER — HOSPITAL ENCOUNTER (OUTPATIENT)
Facility: HOSPITAL | Age: 64
Discharge: HOME | End: 2024-11-03
Payer: COMMERCIAL

## 2024-10-31 DIAGNOSIS — Z87.891 PERSONAL HISTORY OF NICOTINE DEPENDENCE: ICD-10-CM

## 2024-10-31 PROCEDURE — 71271 CT THORAX LUNG CANCER SCR C-: CPT

## 2025-02-06 ENCOUNTER — TELEPHONE (OUTPATIENT)
Age: 65
End: 2025-02-06

## 2025-02-06 NOTE — TELEPHONE ENCOUNTER
Pt needs a refill on her estradiol medication and sent to [facility] on [address].She asked if she could get an increase in the dosage

## 2025-02-07 DIAGNOSIS — N95.1 MENOPAUSAL SYNDROME: Primary | ICD-10-CM

## 2025-02-07 RX ORDER — ESTRADIOL 1 MG/1
1 TABLET ORAL DAILY
Qty: 90 TABLET | Refills: 0 | Status: SHIPPED | OUTPATIENT
Start: 2025-02-07

## 2025-02-07 NOTE — TELEPHONE ENCOUNTER
Patient called back left voicemail that she is looking for refill on medication message already forwarded to provider.

## 2025-02-07 NOTE — TELEPHONE ENCOUNTER
Spoke with the patient and she has been scheduled for an annual exam appointment on 04/02/25.  She is also requesting a refill on her Estradiol but would like an increase in the dosage due to mood swings.  She currently takes the 0.5 mg dose.  Message forwarded to Dr Cruz.

## 2025-02-10 ENCOUNTER — TELEPHONE (OUTPATIENT)
Age: 65
End: 2025-02-10

## 2025-02-10 NOTE — TELEPHONE ENCOUNTER
2/10/2025 @1:11pm-Called and S/W patient regarding message left on VM regarding prescription refill. Pt stated her pharmacy called her about 30 minutes and her prescription is ready.ww

## 2025-03-18 ENCOUNTER — HOSPITAL ENCOUNTER (OUTPATIENT)
Facility: HOSPITAL | Age: 65
Discharge: HOME | End: 2025-03-21
Attending: FAMILY MEDICINE
Payer: COMMERCIAL

## 2025-03-18 DIAGNOSIS — Z13.820 ENCOUNTER FOR SCREENING FOR OSTEOPOROSIS: ICD-10-CM

## 2025-03-18 PROCEDURE — 77080 DXA BONE DENSITY AXIAL: CPT

## 2025-04-09 ENCOUNTER — OFFICE VISIT (OUTPATIENT)
Age: 65
End: 2025-04-09
Payer: COMMERCIAL

## 2025-04-09 VITALS
BODY MASS INDEX: 27.13 KG/M2 | HEIGHT: 63 IN | SYSTOLIC BLOOD PRESSURE: 122 MMHG | WEIGHT: 153.13 LBS | DIASTOLIC BLOOD PRESSURE: 72 MMHG

## 2025-04-09 DIAGNOSIS — Z12.72 SCREENING FOR VAGINAL CANCER: ICD-10-CM

## 2025-04-09 DIAGNOSIS — R92.30 DENSE BREAST TISSUE ON MAMMOGRAM, UNSPECIFIED TYPE: ICD-10-CM

## 2025-04-09 DIAGNOSIS — Z01.419 GYNECOLOGIC EXAM NORMAL: ICD-10-CM

## 2025-04-09 DIAGNOSIS — N95.1 MENOPAUSAL SYNDROME: ICD-10-CM

## 2025-04-09 DIAGNOSIS — Z12.31 SCREENING MAMMOGRAM FOR BREAST CANCER: Primary | ICD-10-CM

## 2025-04-09 DIAGNOSIS — Z90.710 HISTORY OF HYSTERECTOMY: ICD-10-CM

## 2025-04-09 PROCEDURE — 99396 PREV VISIT EST AGE 40-64: CPT | Performed by: OBSTETRICS & GYNECOLOGY

## 2025-04-09 RX ORDER — ESTRADIOL 1 MG/1
1 TABLET ORAL DAILY
Qty: 90 TABLET | Refills: 3 | Status: SHIPPED | OUTPATIENT
Start: 2025-04-09

## 2025-04-09 ASSESSMENT — ENCOUNTER SYMPTOMS
RESPIRATORY NEGATIVE: 1
GASTROINTESTINAL NEGATIVE: 1

## 2025-04-09 NOTE — PROGRESS NOTES
Peggy Bermudez is a No obstetric history on file., 64 y.o. female   No LMP recorded. Patient has had a hysterectomy.    She presents for her annual    She is having no significant problems.Feels more short tempered than normal- on medicine but feels like it is not effective  No HF/NS  Instructed pt to discuss with PCP may need a different medicine or dosing      Menstrual status:  Cycles are hysterectomy.    Flow: absent.      She does not have dysmenorrhea.      Medical conditions:  Since her last annual GYN exam about one year ago, she has not the following changes in her health history: none.     Mammogram History:    SATYA Results (most recent):  @[employer] [facility]([id number]:1)@     DEXA Results (most recent):  @[employer] [facility]([id number]:1)@       Past Medical History:   Diagnosis Date    Anxiety     Cystocele with prolapse 8/30/2020    Menopause 8/30/2020    Osteopenia 8/30/2020    Smoker 8/30/2020     Past Surgical History:   Procedure Laterality Date    BREAST BIOPSY      BREAST BIOPSY      X2 normal    CHOLECYSTECTOMY      GI      cholecystectomy    HYSTERECTOMY, VAGINAL         Prior to Admission medications    Medication Sig Start Date End Date Taking? Authorizing Provider   estradiol (ESTRACE) 1 MG tablet Take 1 tablet by mouth daily 4/9/25  Yes Inocente Cruz MD   latanoprost (XALATAN) 0.005 % ophthalmic solution INSTILL 1 DROP IN BOTH EYES EVERY DAY IN THE MORNING 2/7/23  Yes ProviderLora MD   tolterodine (DETROL) 2 MG tablet Take 1 tablet by mouth 2 times daily 4/17/23  Yes ProviderLora MD       No Known Allergies       Tobacco History:  reports that she has been smoking cigarettes. She started smoking about 43 years ago. She has a 21.6 pack-year smoking history. She has never used smokeless tobacco.  Alcohol use:  reports no history of alcohol use.  Drug use:  reports no history of drug use.    Family Medical/Cancer History:   Family History   Problem Relation Age of Onset    Other Mother

## 2025-04-09 NOTE — PROGRESS NOTES
Chief Complaint   Patient presents with    Annual Exam     Mammo-3-03-22     /72 (BP Site: Right Upper Arm, Patient Position: Sitting)   Ht 1.588 m (5' 2.5\")   Wt 69.5 kg (153 lb 2 oz)   BMI 27.56 kg/m²

## 2025-04-26 LAB
., LABCORP: NORMAL
CYTOLOGIST CVX/VAG CYTO: NORMAL
CYTOLOGY CVX/VAG DOC CYTO: NORMAL
CYTOLOGY CVX/VAG DOC THIN PREP: NORMAL
DX ICD CODE: NORMAL
Lab: NORMAL
OTHER STN SPEC: NORMAL
SERVICE CMNT-IMP: NORMAL
STAT OF ADQ CVX/VAG CYTO-IMP: NORMAL

## 2025-04-28 ENCOUNTER — RESULTS FOLLOW-UP (OUTPATIENT)
Age: 65
End: 2025-04-28

## 2025-04-30 ENCOUNTER — TELEPHONE (OUTPATIENT)
Age: 65
End: 2025-04-30

## 2025-04-30 NOTE — TELEPHONE ENCOUNTER
Patient called questioning when she had her last mammogram.  She was advised the last result we have is from 08/23/23.  She will contact [facility] to find out the date of her last mammogram and let me know.  She states she may decide to have her imaging done at [facility] and if so she was made aware she will need to let me know so her orders can be updated.

## 2025-04-30 NOTE — TELEPHONE ENCOUNTER
Patient called back and she was advised [facility] does not perform screening ultrasounds and she may contact an [facility] facility to find out if they do.  Detailed information regarding dense breast tissue is normal in women and not a reason to do an ultrasound unless recommended by the radiologist.

## 2025-05-01 ENCOUNTER — TELEPHONE (OUTPATIENT)
Age: 65
End: 2025-05-01

## 2025-05-01 NOTE — TELEPHONE ENCOUNTER
Patient contacted the office reports she has a question about results(lab).  Attempted to contact her back unable to leave voicemail.